# Patient Record
Sex: MALE | Race: WHITE | Employment: FULL TIME | URBAN - METROPOLITAN AREA
[De-identification: names, ages, dates, MRNs, and addresses within clinical notes are randomized per-mention and may not be internally consistent; named-entity substitution may affect disease eponyms.]

---

## 2019-12-22 ENCOUNTER — HOSPITAL ENCOUNTER (EMERGENCY)
Age: 34
Discharge: HOME | End: 2019-12-22
Attending: EMERGENCY MEDICINE
Payer: MEDICAID

## 2019-12-22 ENCOUNTER — APPOINTMENT (OUTPATIENT)
Dept: GENERAL RADIOLOGY | Age: 34
End: 2019-12-22
Payer: MEDICAID

## 2019-12-22 VITALS
WEIGHT: 177.25 LBS | SYSTOLIC BLOOD PRESSURE: 124 MMHG | DIASTOLIC BLOOD PRESSURE: 86 MMHG | HEIGHT: 67 IN | RESPIRATION RATE: 16 BRPM | HEART RATE: 77 BPM | TEMPERATURE: 98.4 F | BODY MASS INDEX: 27.82 KG/M2 | OXYGEN SATURATION: 98 %

## 2019-12-22 DIAGNOSIS — S66.911A WRIST STRAIN, RIGHT, INITIAL ENCOUNTER: ICD-10-CM

## 2019-12-22 DIAGNOSIS — S01.01XA LACERATION OF SCALP, INITIAL ENCOUNTER: ICD-10-CM

## 2019-12-22 DIAGNOSIS — S39.012A STRAIN OF LUMBAR REGION, INITIAL ENCOUNTER: Primary | ICD-10-CM
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
## 2019-12-22 PROCEDURE — 72100 X-RAY EXAM L-S SPINE 2/3 VWS: CPT

## 2019-12-22 PROCEDURE — 73110 X-RAY EXAM OF WRIST: CPT

## 2019-12-22 PROCEDURE — 6370000000 HC RX 637 (ALT 250 FOR IP): Performed by: EMERGENCY MEDICINE

## 2019-12-22 PROCEDURE — 99283 EMERGENCY DEPT VISIT LOW MDM: CPT

## 2019-12-22 RX ORDER — IBUPROFEN 600 MG/1
600 TABLET ORAL EVERY 6 HOURS PRN
Qty: 30 TABLET | Refills: 0 | Status: SHIPPED | OUTPATIENT
Start: 2019-12-22 | End: 2022-08-10

## 2019-12-22 RX ORDER — IBUPROFEN 600 MG/1
600 TABLET ORAL ONCE
Status: COMPLETED | OUTPATIENT
Start: 2019-12-22 | End: 2019-12-22

## 2019-12-22 RX ORDER — HYDROCODONE BITARTRATE AND ACETAMINOPHEN 5; 325 MG/1; MG/1
1 TABLET ORAL EVERY 6 HOURS PRN
Qty: 20 TABLET | Refills: 0 | Status: SHIPPED | OUTPATIENT
Start: 2019-12-22 | End: 2019-12-27

## 2019-12-22 RX ORDER — HYDROCODONE BITARTRATE AND ACETAMINOPHEN 5; 325 MG/1; MG/1
1 TABLET ORAL ONCE
Status: COMPLETED | OUTPATIENT
Start: 2019-12-22 | End: 2019-12-22

## 2019-12-22 RX ADMIN — IBUPROFEN 600 MG: 600 TABLET, FILM COATED ORAL at 13:26

## 2019-12-22 RX ADMIN — HYDROCODONE BITARTRATE AND ACETAMINOPHEN 1 TABLET: 5; 325 TABLET ORAL at 13:27

## 2019-12-22 ASSESSMENT — ENCOUNTER SYMPTOMS
ABDOMINAL PAIN: 0
SHORTNESS OF BREATH: 0
DIARRHEA: 0
RHINORRHEA: 0
EYE REDNESS: 0
SORE THROAT: 0
NAUSEA: 0
EYE DISCHARGE: 0
COUGH: 0
BACK PAIN: 1
WHEEZING: 0
VOMITING: 0
EYE PAIN: 0

## 2019-12-22 ASSESSMENT — PAIN DESCRIPTION - ONSET
ONSET: SUDDEN
ONSET_2: SUDDEN

## 2019-12-22 ASSESSMENT — PAIN DESCRIPTION - LOCATION
LOCATION: WRIST
LOCATION: BACK;WRIST
LOCATION_2: BACK

## 2019-12-22 ASSESSMENT — PAIN SCALES - GENERAL
PAINLEVEL_OUTOF10: 7
PAINLEVEL_OUTOF10: 7
PAINLEVEL_OUTOF10: 8

## 2019-12-22 ASSESSMENT — PAIN DESCRIPTION - ORIENTATION
ORIENTATION_2: LOWER
ORIENTATION: RIGHT

## 2019-12-22 ASSESSMENT — PAIN DESCRIPTION - INTENSITY: RATING_2: 7

## 2019-12-22 ASSESSMENT — PAIN DESCRIPTION - PROGRESSION
CLINICAL_PROGRESSION: GRADUALLY WORSENING
CLINICAL_PROGRESSION_2: GRADUALLY WORSENING

## 2019-12-22 ASSESSMENT — PAIN DESCRIPTION - PAIN TYPE
TYPE: ACUTE PAIN
TYPE: ACUTE PAIN

## 2019-12-22 ASSESSMENT — PAIN - FUNCTIONAL ASSESSMENT
PAIN_FUNCTIONAL_ASSESSMENT: ACTIVITIES ARE NOT PREVENTED
PAIN_FUNCTIONAL_ASSESSMENT: 0-10

## 2019-12-22 ASSESSMENT — PAIN DESCRIPTION - DESCRIPTORS
DESCRIPTORS: THROBBING;SHOOTING
DESCRIPTORS_2: ACHING

## 2019-12-22 ASSESSMENT — PAIN DESCRIPTION - DURATION: DURATION_2: CONTINUOUS

## 2019-12-22 ASSESSMENT — PAIN DESCRIPTION - FREQUENCY: FREQUENCY: CONTINUOUS

## 2020-01-08 ENCOUNTER — HOSPITAL ENCOUNTER (EMERGENCY)
Age: 35
Discharge: LEFT BEFORE DISCHARGE | End: 2020-01-08
Payer: MEDICAID

## 2020-01-08 ENCOUNTER — APPOINTMENT (OUTPATIENT)
Dept: GENERAL RADIOLOGY | Age: 35
End: 2020-01-08
Payer: MEDICAID

## 2020-01-08 VITALS
TEMPERATURE: 98.8 F | RESPIRATION RATE: 16 BRPM | HEIGHT: 67 IN | WEIGHT: 177.25 LBS | DIASTOLIC BLOOD PRESSURE: 74 MMHG | HEART RATE: 102 BPM | BODY MASS INDEX: 27.82 KG/M2 | SYSTOLIC BLOOD PRESSURE: 128 MMHG | OXYGEN SATURATION: 96 %

## 2020-01-08 LAB
ANION GAP SERPL CALCULATED.3IONS-SCNC: 15 MMOL/L (ref 3–16)
BASOPHILS ABSOLUTE: 0.1 K/UL (ref 0–0.2)
BASOPHILS RELATIVE PERCENT: 0.8 %
BUN BLDV-MCNC: 9 MG/DL (ref 7–20)
CALCIUM SERPL-MCNC: 8.8 MG/DL (ref 8.3–10.6)
CHLORIDE BLD-SCNC: 101 MMOL/L (ref 99–110)
CO2: 23 MMOL/L (ref 21–32)
CREAT SERPL-MCNC: 0.8 MG/DL (ref 0.9–1.3)
D DIMER: 214 NG/ML DDU (ref 0–229)
EKG ATRIAL RATE: 102 BPM
EKG DIAGNOSIS: NORMAL
EKG P AXIS: 57 DEGREES
EKG P-R INTERVAL: 124 MS
EKG Q-T INTERVAL: 342 MS
EKG QRS DURATION: 84 MS
EKG QTC CALCULATION (BAZETT): 445 MS
EKG R AXIS: 43 DEGREES
EKG T AXIS: 38 DEGREES
EKG VENTRICULAR RATE: 102 BPM
EOSINOPHILS ABSOLUTE: 0 K/UL (ref 0–0.6)
EOSINOPHILS RELATIVE PERCENT: 0.4 %
GFR AFRICAN AMERICAN: >60
GFR NON-AFRICAN AMERICAN: >60
GLUCOSE BLD-MCNC: 142 MG/DL (ref 70–99)
HCT VFR BLD CALC: 42.8 % (ref 40.5–52.5)
HEMOGLOBIN: 14.3 G/DL (ref 13.5–17.5)
LYMPHOCYTES ABSOLUTE: 0.8 K/UL (ref 1–5.1)
LYMPHOCYTES RELATIVE PERCENT: 9.3 %
MCH RBC QN AUTO: 30.1 PG (ref 26–34)
MCHC RBC AUTO-ENTMCNC: 33.5 G/DL (ref 31–36)
MCV RBC AUTO: 89.7 FL (ref 80–100)
MONOCYTES ABSOLUTE: 0.8 K/UL (ref 0–1.3)
MONOCYTES RELATIVE PERCENT: 8.5 %
NEUTROPHILS ABSOLUTE: 7.3 K/UL (ref 1.7–7.7)
NEUTROPHILS RELATIVE PERCENT: 81 %
PDW BLD-RTO: 13.4 % (ref 12.4–15.4)
PLATELET # BLD: 261 K/UL (ref 135–450)
PMV BLD AUTO: 9.8 FL (ref 5–10.5)
POTASSIUM SERPL-SCNC: 3.7 MMOL/L (ref 3.5–5.1)
RBC # BLD: 4.77 M/UL (ref 4.2–5.9)
SODIUM BLD-SCNC: 139 MMOL/L (ref 136–145)
TROPONIN: <0.01 NG/ML
WBC # BLD: 9 K/UL (ref 4–11)

## 2020-01-08 PROCEDURE — 93005 ELECTROCARDIOGRAM TRACING: CPT | Performed by: PHYSICIAN ASSISTANT

## 2020-01-08 PROCEDURE — 93010 ELECTROCARDIOGRAM REPORT: CPT | Performed by: INTERNAL MEDICINE

## 2020-01-08 PROCEDURE — 85025 COMPLETE CBC W/AUTO DIFF WBC: CPT

## 2020-01-08 PROCEDURE — 99285 EMERGENCY DEPT VISIT HI MDM: CPT

## 2020-01-08 PROCEDURE — 85379 FIBRIN DEGRADATION QUANT: CPT

## 2020-01-08 PROCEDURE — 84484 ASSAY OF TROPONIN QUANT: CPT

## 2020-01-08 PROCEDURE — 71046 X-RAY EXAM CHEST 2 VIEWS: CPT

## 2020-01-08 PROCEDURE — 80048 BASIC METABOLIC PNL TOTAL CA: CPT

## 2020-01-08 RX ORDER — HYDROCODONE BITARTRATE AND ACETAMINOPHEN 5; 325 MG/1; MG/1
1 TABLET ORAL EVERY 6 HOURS PRN
COMMUNITY
End: 2022-08-10

## 2020-01-08 RX ORDER — ONDANSETRON 4 MG/1
4 TABLET, ORALLY DISINTEGRATING ORAL ONCE
Status: DISCONTINUED | OUTPATIENT
Start: 2020-01-08 | End: 2020-01-09 | Stop reason: HOSPADM

## 2020-01-08 ASSESSMENT — ENCOUNTER SYMPTOMS
SHORTNESS OF BREATH: 1
BACK PAIN: 0
ABDOMINAL PAIN: 0
VOMITING: 0
COLOR CHANGE: 0
COUGH: 0

## 2020-01-08 ASSESSMENT — PAIN DESCRIPTION - PROGRESSION: CLINICAL_PROGRESSION: NOT CHANGED

## 2020-01-08 ASSESSMENT — PAIN DESCRIPTION - PAIN TYPE
TYPE: ACUTE PAIN
TYPE: ACUTE PAIN

## 2020-01-08 ASSESSMENT — PAIN DESCRIPTION - FREQUENCY
FREQUENCY: INTERMITTENT
FREQUENCY: CONTINUOUS

## 2020-01-08 ASSESSMENT — PAIN DESCRIPTION - ONSET: ONSET: SUDDEN

## 2020-01-08 ASSESSMENT — PAIN SCALES - GENERAL
PAINLEVEL_OUTOF10: 7
PAINLEVEL_OUTOF10: 8

## 2020-01-08 ASSESSMENT — PAIN DESCRIPTION - LOCATION
LOCATION: CHEST
LOCATION: CHEST

## 2020-01-08 ASSESSMENT — PAIN - FUNCTIONAL ASSESSMENT: PAIN_FUNCTIONAL_ASSESSMENT: ACTIVITIES ARE NOT PREVENTED

## 2020-01-08 ASSESSMENT — PAIN DESCRIPTION - DESCRIPTORS
DESCRIPTORS: PRESSURE;SHARP
DESCRIPTORS: SHARP

## 2020-01-08 NOTE — ED PROVIDER NOTES
round, and reactive to light. Neck:      Musculoskeletal: Normal range of motion. Cardiovascular:      Rate and Rhythm: Tachycardia present. Pulses: Normal pulses. Pulmonary:      Effort: Pulmonary effort is normal. No respiratory distress. Abdominal:      Tenderness: There is no tenderness. Musculoskeletal: Normal range of motion. General: No swelling. Skin:     General: Skin is warm. Neurological:      General: No focal deficit present. Mental Status: He is alert and oriented to person, place, and time. DIAGNOSTIC RESULTS     EKG: All EKG's are interpreted by MARLINE Castillo in the absence of a cardiologist.    EKG interpreted by myself - please refer to attending physician's note for complete EKG interpretation:    Rhythm: sinus rhythm   Rate: tachy 102  No evidence of acute ischemia or injury. RADIOLOGY:   Non-plain film images such as CT, Ultrasound and MRI are read by the radiologist. Plain radiographic images are visualized and preliminarily interpreted by MARLINE Castillo with the below findings:    Reviewed radiologist's interpretation.     Interpretation per the Radiologist below, if available at the time of this note:    XR CHEST STANDARD (2 VW)   Final Result   No acute findings               LABS:  Labs Reviewed   CBC WITH AUTO DIFFERENTIAL - Abnormal; Notable for the following components:       Result Value    Lymphocytes Absolute 0.8 (*)     All other components within normal limits    Narrative:     Performed at:  [address] [address]   Phone [phone]   BASIC METABOLIC PANEL - Abnormal; Notable for the following components:    Glucose 142 (*)     CREATININE 0.8 (*)     All other components within normal limits    Narrative:     Performed at:  [address] [address]   Phone [phone]   D-DIMER, QUANTITATIVE Narrative:     Performed at:  [facility]) [facility]  [address]   Phone [phone]   TROPONIN       All other labs were within normal range or not returned as of this dictation. EMERGENCY DEPARTMENT COURSE and DIFFERENTIAL DIAGNOSIS/MDM:   Vitals:    Vitals:    01/08/20 1341   BP: 128/74   Pulse: 102   Resp: 16   Temp: 98.8 °F (37.1 °C)   TempSrc: Oral   SpO2: 96%   Weight: 177 lb 4 oz (80.4 kg)   Height: 5' 7\" (1.702 m)     Patient is afebrile, initially tachycardic at 102 blood pressure 598 systolic. D-dimer not elevated. The patient eloped from the emergency department before all of the lab work had resulted and I was able to discharge him or reevaluate him. CONSULTS:  None    PROCEDURES:  None    FINAL IMPRESSION      1. Chest pain, unspecified type          DISPOSITION/PLAN   DISPOSITION        PATIENT REFERRED TO:  No follow-up provider specified.     DISCHARGE MEDICATIONS:  New Prescriptions    No medications on file       (Please note that portions of this note were completed with a voice recognition program.  Efforts were made to edit the dictations but occasionally words are mis-transcribed.)    Adelita Castro, [address], [place]  01/08/20 2090

## 2020-01-08 NOTE — ED NOTES
This nurse went into the room to discharge patient and the patient was not in the room.       Omer Meier RN  01/08/20 4161

## 2020-01-08 NOTE — ED NOTES
OFFICER AGARWAL -PHONE [phone]. PLEASE CALL WHEN DISCHARGED.       Airam Parish RN  01/08/20 6374
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
## 2020-04-27 ENCOUNTER — HOSPITAL ENCOUNTER (EMERGENCY)
Age: 35
Discharge: LEFT BEFORE DISCHARGE | End: 2020-04-27
Attending: EMERGENCY MEDICINE
Payer: MEDICAID

## 2022-08-10 ENCOUNTER — HOSPITAL ENCOUNTER (EMERGENCY)
Age: 37
Discharge: HOME | End: 2022-08-10
Payer: MEDICAID

## 2022-08-10 ENCOUNTER — APPOINTMENT (OUTPATIENT)
Dept: GENERAL RADIOLOGY | Age: 37
End: 2022-08-10
Payer: MEDICAID

## 2022-08-10 VITALS
DIASTOLIC BLOOD PRESSURE: 88 MMHG | BODY MASS INDEX: 28.88 KG/M2 | OXYGEN SATURATION: 99 % | HEIGHT: 67 IN | RESPIRATION RATE: 16 BRPM | WEIGHT: 184 LBS | TEMPERATURE: 97.6 F | HEART RATE: 79 BPM | SYSTOLIC BLOOD PRESSURE: 137 MMHG

## 2022-08-10 DIAGNOSIS — S90.01XA CONTUSION OF RIGHT ANKLE, INITIAL ENCOUNTER: Primary | ICD-10-CM

## 2022-08-10 PROCEDURE — 99283 EMERGENCY DEPT VISIT LOW MDM: CPT

## 2022-08-10 PROCEDURE — 73610 X-RAY EXAM OF ANKLE: CPT

## 2022-08-10 ASSESSMENT — ENCOUNTER SYMPTOMS
BACK PAIN: 0
COLOR CHANGE: 1

## 2022-08-10 ASSESSMENT — PAIN - FUNCTIONAL ASSESSMENT
PAIN_FUNCTIONAL_ASSESSMENT: NONE - DENIES PAIN
PAIN_FUNCTIONAL_ASSESSMENT: NONE - DENIES PAIN
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
## 2022-08-10 NOTE — Clinical Note
Vianney Perez was seen and treated in our emergency department on 8/10/2022. He may return to work on 08/13/2022. If you have any questions or concerns, please don't hesitate to call.       Jorge Alberto Marquez PA-C

## 2022-08-11 ENCOUNTER — TELEPHONE (OUTPATIENT)
Dept: ORTHOPEDIC SURGERY | Age: 37
End: 2022-08-11

## 2022-08-12 ENCOUNTER — TELEPHONE (OUTPATIENT)
Dept: ORTHOPEDIC SURGERY | Age: 37
End: 2022-08-12

## 2022-08-15 ENCOUNTER — TELEPHONE (OUTPATIENT)
Dept: ORTHOPEDIC SURGERY | Age: 37
End: 2022-08-15

## 2022-10-16 ENCOUNTER — APPOINTMENT (OUTPATIENT)
Dept: CT IMAGING | Age: 37
End: 2022-10-16
Payer: MEDICAID

## 2022-10-16 ENCOUNTER — HOSPITAL ENCOUNTER (EMERGENCY)
Age: 37
Discharge: HOME | End: 2022-10-16
Attending: EMERGENCY MEDICINE
Payer: MEDICAID

## 2022-10-16 VITALS
RESPIRATION RATE: 16 BRPM | BODY MASS INDEX: 32.03 KG/M2 | HEART RATE: 71 BPM | OXYGEN SATURATION: 98 % | TEMPERATURE: 97.9 F | WEIGHT: 199.3 LBS | DIASTOLIC BLOOD PRESSURE: 75 MMHG | SYSTOLIC BLOOD PRESSURE: 118 MMHG | HEIGHT: 66 IN

## 2022-10-16 DIAGNOSIS — R10.11 RIGHT UPPER QUADRANT ABDOMINAL PAIN: Primary | ICD-10-CM

## 2022-10-16 DIAGNOSIS — R11.2 NAUSEA AND VOMITING, UNSPECIFIED VOMITING TYPE: ICD-10-CM

## 2022-10-16 LAB
A/G RATIO: 2 (ref 1.1–2.2)
ALBUMIN SERPL-MCNC: 4.2 G/DL (ref 3.4–5)
ALP BLD-CCNC: 62 U/L (ref 40–129)
ALT SERPL-CCNC: 90 U/L (ref 10–40)
ANION GAP SERPL CALCULATED.3IONS-SCNC: 10 MMOL/L (ref 3–16)
AST SERPL-CCNC: 42 U/L (ref 15–37)
ATYPICAL LYMPHOCYTE RELATIVE PERCENT: 2 % (ref 0–6)
BANDED NEUTROPHILS RELATIVE PERCENT: 2 % (ref 0–7)
BASOPHILS ABSOLUTE: 0.1 K/UL (ref 0–0.2)
BASOPHILS RELATIVE PERCENT: 1 %
BILIRUB SERPL-MCNC: 0.3 MG/DL (ref 0–1)
BILIRUBIN URINE: NEGATIVE
BLOOD, URINE: NEGATIVE
BUN BLDV-MCNC: 8 MG/DL (ref 7–20)
CALCIUM SERPL-MCNC: 9.5 MG/DL (ref 8.3–10.6)
CHLORIDE BLD-SCNC: 105 MMOL/L (ref 99–110)
CLARITY: ABNORMAL
CO2: 30 MMOL/L (ref 21–32)
COLOR: YELLOW
CREAT SERPL-MCNC: 1.1 MG/DL (ref 0.9–1.3)
EOSINOPHILS ABSOLUTE: 0.2 K/UL (ref 0–0.6)
EOSINOPHILS RELATIVE PERCENT: 2 %
GFR AFRICAN AMERICAN: >60
GFR NON-AFRICAN AMERICAN: >60
GLUCOSE BLD-MCNC: 97 MG/DL (ref 70–99)
GLUCOSE URINE: NEGATIVE MG/DL
HCT VFR BLD CALC: 40.4 % (ref 40.5–52.5)
HEMOGLOBIN: 14 G/DL (ref 13.5–17.5)
KETONES, URINE: NEGATIVE MG/DL
LEUKOCYTE ESTERASE, URINE: NEGATIVE
LIPASE: 14 U/L (ref 13–60)
LYMPHOCYTES ABSOLUTE: 2.6 K/UL (ref 1–5.1)
LYMPHOCYTES RELATIVE PERCENT: 27 %
MCH RBC QN AUTO: 29.6 PG (ref 26–34)
MCHC RBC AUTO-ENTMCNC: 34.8 G/DL (ref 31–36)
MCV RBC AUTO: 85.1 FL (ref 80–100)
MICROSCOPIC EXAMINATION: ABNORMAL
MONOCYTES ABSOLUTE: 1 K/UL (ref 0–1.3)
MONOCYTES RELATIVE PERCENT: 11 %
NEUTROPHILS ABSOLUTE: 5.1 K/UL (ref 1.7–7.7)
NEUTROPHILS RELATIVE PERCENT: 55 %
NITRITE, URINE: NEGATIVE
OCCULT BLOOD SCREENING: NORMAL
PDW BLD-RTO: 15 % (ref 12.4–15.4)
PH UA: 8 (ref 5–8)
PLATELET # BLD: 226 K/UL (ref 135–450)
PMV BLD AUTO: 8.9 FL (ref 5–10.5)
POTASSIUM REFLEX MAGNESIUM: 4.1 MMOL/L (ref 3.5–5.1)
PROTEIN UA: NEGATIVE MG/DL
RBC # BLD: 4.74 M/UL (ref 4.2–5.9)
SODIUM BLD-SCNC: 145 MMOL/L (ref 136–145)
SPECIFIC GRAVITY UA: 1.01 (ref 1–1.03)
TOTAL PROTEIN: 6.3 G/DL (ref 6.4–8.2)
URINE REFLEX TO CULTURE: ABNORMAL
URINE TYPE: ABNORMAL
UROBILINOGEN, URINE: 0.2 E.U./DL
WBC # BLD: 8.9 K/UL (ref 4–11)

## 2022-10-16 PROCEDURE — 83690 ASSAY OF LIPASE: CPT

## 2022-10-16 PROCEDURE — 6360000004 HC RX CONTRAST MEDICATION: Performed by: EMERGENCY MEDICINE

## 2022-10-16 PROCEDURE — 96375 TX/PRO/DX INJ NEW DRUG ADDON: CPT

## 2022-10-16 PROCEDURE — 74177 CT ABD & PELVIS W/CONTRAST: CPT

## 2022-10-16 PROCEDURE — 6360000002 HC RX W HCPCS: Performed by: EMERGENCY MEDICINE

## 2022-10-16 PROCEDURE — 80074 ACUTE HEPATITIS PANEL: CPT

## 2022-10-16 PROCEDURE — 96374 THER/PROPH/DIAG INJ IV PUSH: CPT

## 2022-10-16 PROCEDURE — 2580000003 HC RX 258: Performed by: EMERGENCY MEDICINE

## 2022-10-16 PROCEDURE — 82270 OCCULT BLOOD FECES: CPT

## 2022-10-16 PROCEDURE — 6370000000 HC RX 637 (ALT 250 FOR IP): Performed by: EMERGENCY MEDICINE

## 2022-10-16 PROCEDURE — 36415 COLL VENOUS BLD VENIPUNCTURE: CPT

## 2022-10-16 PROCEDURE — 80053 COMPREHEN METABOLIC PANEL: CPT

## 2022-10-16 PROCEDURE — 85025 COMPLETE CBC W/AUTO DIFF WBC: CPT

## 2022-10-16 PROCEDURE — 99285 EMERGENCY DEPT VISIT HI MDM: CPT

## 2022-10-16 PROCEDURE — 2500000003 HC RX 250 WO HCPCS: Performed by: EMERGENCY MEDICINE

## 2022-10-16 PROCEDURE — 96361 HYDRATE IV INFUSION ADD-ON: CPT

## 2022-10-16 PROCEDURE — 81003 URINALYSIS AUTO W/O SCOPE: CPT

## 2022-10-16 RX ORDER — DICYCLOMINE HYDROCHLORIDE 10 MG/1
10 CAPSULE ORAL ONCE
Status: COMPLETED | OUTPATIENT
Start: 2022-10-16 | End: 2022-10-16

## 2022-10-16 RX ORDER — FAMOTIDINE 20 MG/1
20 TABLET, FILM COATED ORAL 2 TIMES DAILY
Qty: 60 TABLET | Refills: 0 | Status: ON HOLD | OUTPATIENT
Start: 2022-10-16 | End: 2022-10-27 | Stop reason: HOSPADM

## 2022-10-16 RX ORDER — KETOROLAC TROMETHAMINE 15 MG/ML
15 INJECTION, SOLUTION INTRAMUSCULAR; INTRAVENOUS ONCE
Status: COMPLETED | OUTPATIENT
Start: 2022-10-16 | End: 2022-10-16

## 2022-10-16 RX ORDER — DICYCLOMINE HYDROCHLORIDE 10 MG/1
10 CAPSULE ORAL EVERY 6 HOURS PRN
Qty: 20 CAPSULE | Refills: 0 | Status: ON HOLD | OUTPATIENT
Start: 2022-10-16 | End: 2022-10-27 | Stop reason: HOSPADM

## 2022-10-16 RX ORDER — ONDANSETRON 4 MG/1
4 TABLET, ORALLY DISINTEGRATING ORAL ONCE
Status: COMPLETED | OUTPATIENT
Start: 2022-10-16 | End: 2022-10-16

## 2022-10-16 RX ORDER — 0.9 % SODIUM CHLORIDE 0.9 %
1000 INTRAVENOUS SOLUTION INTRAVENOUS ONCE
Status: COMPLETED | OUTPATIENT
Start: 2022-10-16 | End: 2022-10-16

## 2022-10-16 RX ORDER — FAMOTIDINE 10 MG/ML
20 INJECTION, SOLUTION INTRAVENOUS ONCE
Status: COMPLETED | OUTPATIENT
Start: 2022-10-16 | End: 2022-10-16

## 2022-10-16 RX ORDER — ONDANSETRON 4 MG/1
4 TABLET, ORALLY DISINTEGRATING ORAL EVERY 8 HOURS PRN
Qty: 20 TABLET | Refills: 0 | Status: SHIPPED | OUTPATIENT
Start: 2022-10-16

## 2022-10-16 RX ADMIN — KETOROLAC TROMETHAMINE 15 MG: 15 INJECTION, SOLUTION INTRAMUSCULAR; INTRAVENOUS at 19:57

## 2022-10-16 RX ADMIN — DICYCLOMINE HYDROCHLORIDE 10 MG: 10 CAPSULE ORAL at 22:38

## 2022-10-16 RX ADMIN — ONDANSETRON 4 MG: 4 TABLET, ORALLY DISINTEGRATING ORAL at 19:48

## 2022-10-16 RX ADMIN — SODIUM CHLORIDE 1000 ML: 9 INJECTION, SOLUTION INTRAVENOUS at 19:56

## 2022-10-16 RX ADMIN — FAMOTIDINE 20 MG: 10 INJECTION, SOLUTION INTRAVENOUS at 22:35

## 2022-10-16 RX ADMIN — IOPAMIDOL 100 ML: 755 INJECTION, SOLUTION INTRAVENOUS at 20:56

## 2022-10-16 ASSESSMENT — PAIN DESCRIPTION - DESCRIPTORS: DESCRIPTORS: SHARP

## 2022-10-16 ASSESSMENT — PAIN DESCRIPTION - ORIENTATION: ORIENTATION: RIGHT

## 2022-10-16 ASSESSMENT — ENCOUNTER SYMPTOMS
EYE PAIN: 0
BACK PAIN: 1
COUGH: 1
RHINORRHEA: 0
ABDOMINAL PAIN: 1
SORE THROAT: 1

## 2022-10-16 ASSESSMENT — PAIN DESCRIPTION - LOCATION: LOCATION: ABDOMEN

## 2022-10-16 ASSESSMENT — PAIN SCALES - GENERAL
PAINLEVEL_OUTOF10: 7
PAINLEVEL_OUTOF10: 8
PAINLEVEL_OUTOF10: 8

## 2022-10-16 ASSESSMENT — PAIN DESCRIPTION - PAIN TYPE: TYPE: ACUTE PAIN

## 2022-10-16 NOTE — ED NOTES
[id number]   Pt very talkative and difficult to stay focused on question asked. Sick since 10/10 with constant right sided abd pain and intermittent dizziness. abd pain now at 8 and took aleve at 1300. Vomited 4 times today. Greenish/black diarrhea today once (reports black tarry stool yesterday). Pt states fell down last 2 indoor wooden steps on 10/13 due to feeling dizzy (also reports having fever 101 that day)-pt fell backwards onto back and then forward onto knees. Healing dry abrasions to bilat lower legs (just below knees).        Ayan Gomez, JADA  10/16/22 1094

## 2022-10-16 NOTE — ED PROVIDER NOTES
[address] [facility] ENCOUNTER      Pt Name: Rayo Berg  MRN: [id number]  [place] 1985  Date of evaluation: 10/16/2022  Provider: Coy Oliva MD    CHIEF COMPLAINT       Chief Complaint   Patient presents with    Abdominal Pain     X3days, with n/v and dark stools         HISTORY OF PRESENT ILLNESS   (Location/Symptom, Timing/Onset,Context/Setting, Quality, Duration, Modifying Factors, Severity)  Note limiting factors. Rayo Berg is a 44 y.o. male who presents to the emergency department for abdominal pain since Monday. Complete right sided pain radiating to the back, he was sitting watching tv, started off as a strong pain initially intermittent then became constant. He has been vomiting since Thursday. Every time he eats he feels a sourness. He is able to keep soup down. Pain does not change with eating. He has had diarrhea and has had tarry dark stools. Started having fevers on Thursday. Has tried Aleve, ibuprofen, imitrol and tylenol. These did not help him at all. He has been lightheaded and he fell on Thursday. Nursing notes were reviewed. REVIEW OF SYSTEMS    (2-9 systems for level 4, 10 or more for level 5)     Review of Systems   Constitutional:  Positive for chills and fever. Negative for diaphoresis. HENT:  Positive for sore throat. Negative for rhinorrhea. Eyes:  Negative for pain. Respiratory:  Positive for cough. Cardiovascular:  Negative for chest pain. Gastrointestinal:  Positive for abdominal pain. Genitourinary:  Negative for dysuria. Musculoskeletal:  Positive for back pain. Neurological:  Negative for headaches. PAST MEDICAL HISTORY     History reviewed. No pertinent past medical history.         SURGICALHISTORY       Past Surgical History:   Procedure Laterality Date    ANKLE SURGERY           CURRENT MEDICATIONS       Discharge Medication List as of 10/16/2022 10:40 PM ALLERGIES     Depakote [divalproex sodium] and Pcn [penicillins]    FAMILY HISTORY       Family History   Problem Relation Age of Onset    Cancer Other     Diabetes Other     High Blood Pressure Other     Stroke Other           SOCIAL HISTORY       Social History     Socioeconomic History    Marital status: Single     Spouse name: None    Number of children: 1    Years of education: None    Highest education level: None   Occupational History    Occupation: Unemployed   Tobacco Use    Smoking status: Former    Smokeless tobacco: Never    Tobacco comments:     10/16/22 vapes   Vaping Use    Vaping Use: Every day    Substances: Nicotine, Flavoring    Devices: Pre-filled or refillable cartridge   Substance and Sexual Activity    Alcohol use: Yes     Comment: 10/16/22 beer once every month    Drug use: No       SCREENINGS    Alan Coma Scale  Eye Opening: Spontaneous  Best Verbal Response: Oriented  Best Motor Response: Obeys commands  Alan Coma Scale Score: 15        PHYSICAL EXAM    (up to 7 for level 4, 8 or more for level 5)     ED Triage Vitals [10/16/22 1856]   BP Temp Temp Source Heart Rate Resp SpO2 Height Weight   (!) 133/92 97.9 °F (36.6 °C) Oral 60 16 98 % 5' 6\" (1.676 m) 199 lb 4.7 oz (90.4 kg)       Physical Exam  Vitals and nursing note reviewed. Constitutional:       Appearance: Normal appearance. He is well-developed. He is not ill-appearing. HENT:      Head: Normocephalic and atraumatic. Right Ear: External ear normal.      Left Ear: External ear normal.      Nose: Nose normal.   Eyes:      General: No scleral icterus. Right eye: No discharge. Left eye: No discharge. Conjunctiva/sclera: Conjunctivae normal.   Cardiovascular:      Rate and Rhythm: Normal rate and regular rhythm. Heart sounds: Normal heart sounds. Pulmonary:      Effort: Pulmonary effort is normal. No respiratory distress. Breath sounds: Normal breath sounds. No wheezing or rales. Abdominal:      General: Bowel sounds are normal. There is no distension. Palpations: Abdomen is soft. Tenderness: There is abdominal tenderness in the right upper quadrant. There is right CVA tenderness. There is no guarding or rebound. Positive signs include Aly's sign. Hernia: No hernia is present. Musculoskeletal:      Cervical back: Neck supple. Skin:     Coloration: Skin is not pale. Neurological:      Mental Status: He is alert. Psychiatric:         Mood and Affect: Mood normal.         Behavior: Behavior normal.           DIAGNOSTIC RESULTS     EKG: All EKG's are interpreted by the Emergency Department Physician who either signs or Co-signs this chart in the absence of a cardiologist.    12 lead EKG shows     RADIOLOGY:   Non-plain film images such as CT, Ultrasound and MRI are read by the radiologist. Plain radiographic images are visualized and preliminarily interpreted by the emergency physician with the below findings:        Interpretation per the Radiologist below, if available at the time of this note:    CT ABDOMEN PELVIS W IV CONTRAST Additional Contrast? None   Final Result   1. Periportal adenopathy, likely reactive. US GALLBLADDER RUQ    (Results Pending)         ED BEDSIDE ULTRASOUND:   Performed by ED Physician - none    LABS:  Labs Reviewed   CBC WITH AUTO DIFFERENTIAL - Abnormal; Notable for the following components:       Result Value    Hematocrit 40.4 (*)     All other components within normal limits   COMPREHENSIVE METABOLIC PANEL W/ REFLEX TO MG FOR LOW K - Abnormal; Notable for the following components:     Total Protein 6.3 (*)     ALT 90 (*)     AST 42 (*)     All other components within normal limits   URINALYSIS WITH REFLEX TO CULTURE - Abnormal; Notable for the following components:    Clarity, UA SL CLOUDY (*)     All other components within normal limits   LIPASE   BLOOD OCCULT STOOL SCREEN #1    Narrative:     ORDER#: [id number] ORDERED BY: VALENCIA ARCINIEGA  SOURCE: Stool                              COLLECTED:  10/16/22 20:27  ANTIBIOTICS AT MIA.:                      RECEIVED :  10/16/22 20:36   HEPATITIS PANEL, ACUTE       All other labs were within normal range or not returned as of this dictation. EMERGENCY DEPARTMENT COURSE and DIFFERENTIAL DIAGNOSIS/MDM:   Vitals:    Vitals:    10/16/22 1856 10/16/22 2115   BP: (!) 133/92 118/75   Pulse: 60 71   Resp: 16 16   Temp: 97.9 °F (36.6 °C)    TempSrc: Oral    SpO2: 98% 98%   Weight: 199 lb 4.7 oz (90.4 kg)    Height: 5' 6\" (1.676 m)        Adult male who has been having right-sided abdominal pain associated with dark stools no vomiting and intermittent diarrhea. Patient also states he has been having intermittent fevers. Basic laboratory studies and a CT scan ordered. The patient is initially given Toradol for his pain. Laboratory studies are unremarkable for any acute process except that his liver enzymes are elevated. Hepatitis panel was ordered. His CT scan shows periportal adenopathy with no other findings. The gallbladder was constricted. Patient's pain is trending down. He is given additional medications here in the emergency room. His Hemoccult is negative. Based on history physical exam and diagnostic work-up at this point I believe that the patient is low risk for serious or life-threatening condition and require further work-up or admission at this point. I recommend continued outpatient care. Return instructions discussed with the patient expresses understanding and is agreeable. Is this patient to be included in the SEP-1 Core Measure due to severe sepsis or septic shock? No   Exclusion criteria - the patient is NOT to be included for SEP-1 Core Measure due to: Infection is not suspected     CRITICAL CARE TIME   None       CONSULTS:  None    PROCEDURES:       Procedures    FINAL IMPRESSION      1. Right upper quadrant abdominal pain    2. Nausea and vomiting, unspecified vomiting type          DISPOSITION/PLAN   DISPOSITION Decision To Discharge 10/16/2022 09:52:12 PM      PATIENT REFERREDTO:  [facility]) Pre-Services  [phone]        DISCHARGEMEDICATIONS:  Discharge Medication List as of 10/16/2022 10:40 PM        START taking these medications    Details   dicyclomine (BENTYL) 10 MG capsule Take 1 capsule by mouth every 6 hours as needed (cramps), Disp-20 capsule, R-0Print      famotidine (PEPCID) 20 MG tablet Take 1 tablet by mouth 2 times daily, Disp-60 tablet, R-0Print      ondansetron (ZOFRAN ODT) 4 MG disintegrating tablet Take 1 tablet by mouth every 8 hours as needed for Nausea, Disp-20 tablet, R-0Print                (Please note that portions of this note were completed with a voice recognition program.  Efforts were made to edit the dictations but occasionally words are mis-transcribed.)    Jodi Bradley MD (electronically signed)  Attending Emergency Physician  I am the primary physician of record         Jodi Bradley MD  10/17/22 1852

## 2022-10-16 NOTE — LETTER
October 20, 2022    Naya Orantes  [address]      Dear Mr. Naya Orantes,    During your Emergency Department visit on 10/16/2022, radiology and/or lab tests were taken and sent for analysis. The Emergency Department physician has reviewed the results and would like to discuss the findings with you. As of this time, attempts to reach you have been unsuccessful. Please contact the Emergency Department at [phone] and ask to speak to the Charge Nurse regarding your results and the possible need for further treatment.     Sincerely,     Dr. Hayley GUSMAN [address]

## 2022-10-16 NOTE — Clinical Note
Toni Rich was seen and treated in our emergency department on 10/16/2022. He may return to work on 10/19/2022. If you have any questions or concerns, please don't hesitate to call.       Alcides Mckee MD

## 2022-10-17 LAB
HAV IGM SER IA-ACNC: ABNORMAL
HEPATITIS B CORE IGM ANTIBODY: ABNORMAL
HEPATITIS B SURFACE ANTIGEN INTERPRETATION: ABNORMAL
HEPATITIS C ANTIBODY INTERPRETATION: REACTIVE

## 2022-10-17 NOTE — ED NOTES
Explained new orders. Right abd pain at 8. No vomiting here in ED.   Instructed in need for urine spec     Kriss Voss RN  10/16/22 2005

## 2022-10-17 NOTE — ED NOTES
Voided clear yellow urine. Reports right sided abd pain at 7 now. No vomiting. Asking when he will be able to leave.      Jacob Iyer RN  10/16/22 2124

## 2022-10-17 NOTE — ED NOTES
EMD to bedside with this RN as chaperone to do rectal exam.  EMD discussed skin tags present around rectum-encouraged follow up with surgeon to discuss options for removal.  No signs of bright red blood with rectal exam.    EMD and this RN encouraged to give urine specimen, pt states still can't void. Explained he has had half of an IV bag and should be able to urinate now. Right sided abd pain at 6-EMD discussed that she will wait for CT results to see how to treat his pain.      Kayla Campbell RN  10/16/22 2036

## 2022-10-18 NOTE — ED NOTES
Right sided abd pain at 6-7. No vomiting in ED. Discharge instructions with pt. Explained rx's. Explained outpt ultrasound of gallbladder to be done. Aware to be NPO after midnight for test.   Explained how to get outpt test scheduled. Encouraged follow up with PCP. Explained importance of having a family doctor for regular medical care. Explained how to get a family doctor. [facility] given. [facility] clinic list given. [facility] referral line given.          Jacob Iyer RN  10/17/22 2966

## 2022-10-18 NOTE — ED NOTES
Explained new orders. Right sided abd pain at 7. EMD would like hepatitis panel drawn before leaving though.      Noan Chapa RN  10/17/22 2381

## 2022-10-20 NOTE — RESULT ENCOUNTER NOTE
October 20, 2022    Spenser Nguyen  [address]  [place]      Dear Mr. Spenser Nguyen,    During your Emergency Department visit on 10/16/2022, radiology and/or lab tests were taken and sent for analysis. The Emergency Department physician has reviewed the results and would like to discuss the findings with you. As of this time, attempts to reach you have been unsuccessful. Please contact the Emergency Department at [phone] and ask to speak to the Charge Nurse regarding your results and the possible need for further treatment. Sincerely,     Dr. Valencia [facility]  [address]        Sent certified letter to address on file.

## 2022-10-24 ENCOUNTER — HOSPITAL ENCOUNTER (INPATIENT)
Age: 37
LOS: 3 days | Discharge: HOME | DRG: 391 | End: 2022-10-27
Attending: STUDENT IN AN ORGANIZED HEALTH CARE EDUCATION/TRAINING PROGRAM | Admitting: STUDENT IN AN ORGANIZED HEALTH CARE EDUCATION/TRAINING PROGRAM
Payer: MEDICAID

## 2022-10-24 ENCOUNTER — APPOINTMENT (OUTPATIENT)
Dept: CT IMAGING | Age: 37
DRG: 391 | End: 2022-10-24
Payer: MEDICAID

## 2022-10-24 ENCOUNTER — APPOINTMENT (OUTPATIENT)
Dept: GENERAL RADIOLOGY | Age: 37
DRG: 391 | End: 2022-10-24
Payer: MEDICAID

## 2022-10-24 DIAGNOSIS — T50.901A ACCIDENTAL DRUG OVERDOSE, INITIAL ENCOUNTER: ICD-10-CM

## 2022-10-24 DIAGNOSIS — R41.89 UNRESPONSIVE EPISODE: ICD-10-CM

## 2022-10-24 DIAGNOSIS — N17.9 AKI (ACUTE KIDNEY INJURY) (HCC): Primary | ICD-10-CM

## 2022-10-24 DIAGNOSIS — B19.20 HEPATITIS C VIRUS INFECTION WITHOUT HEPATIC COMA, UNSPECIFIED CHRONICITY: ICD-10-CM

## 2022-10-24 DIAGNOSIS — E87.8 HYPOCHLOREMIA: ICD-10-CM

## 2022-10-24 DIAGNOSIS — F19.10 POLYSUBSTANCE ABUSE (HCC): ICD-10-CM

## 2022-10-24 DIAGNOSIS — R10.11 ABDOMINAL PAIN, RIGHT UPPER QUADRANT: ICD-10-CM

## 2022-10-24 DIAGNOSIS — K92.0 COFFEE GROUND EMESIS: ICD-10-CM

## 2022-10-24 PROBLEM — R09.02 HYPOXIA: Status: ACTIVE | Noted: 2022-10-24

## 2022-10-24 PROBLEM — R10.9 ABDOMINAL PAIN: Status: ACTIVE | Noted: 2022-10-24

## 2022-10-24 PROBLEM — R41.82 AMS (ALTERED MENTAL STATUS): Status: ACTIVE | Noted: 2022-10-24

## 2022-10-24 LAB
A/G RATIO: 1.5 (ref 1.1–2.2)
ALBUMIN SERPL-MCNC: 5.1 G/DL (ref 3.4–5)
ALP BLD-CCNC: 59 U/L (ref 40–129)
ALT SERPL-CCNC: 86 U/L (ref 10–40)
AMPHETAMINE SCREEN, URINE: ABNORMAL
ANION GAP SERPL CALCULATED.3IONS-SCNC: 16 MMOL/L (ref 3–16)
AST SERPL-CCNC: 64 U/L (ref 15–37)
BACTERIA: NORMAL /HPF
BARBITURATE SCREEN URINE: ABNORMAL
BASOPHILS ABSOLUTE: 0 K/UL (ref 0–0.2)
BASOPHILS RELATIVE PERCENT: 0.3 %
BENZODIAZEPINE SCREEN, URINE: ABNORMAL
BILIRUB SERPL-MCNC: 0.8 MG/DL (ref 0–1)
BILIRUBIN URINE: NEGATIVE
BLOOD, URINE: NEGATIVE
BUN BLDV-MCNC: 22 MG/DL (ref 7–20)
CALCIUM SERPL-MCNC: 9.1 MG/DL (ref 8.3–10.6)
CANNABINOID SCREEN URINE: POSITIVE
CHLORIDE BLD-SCNC: 88 MMOL/L (ref 99–110)
CLARITY: CLEAR
CO2: 33 MMOL/L (ref 21–32)
COCAINE METABOLITE SCREEN URINE: POSITIVE
COLOR: YELLOW
CREAT SERPL-MCNC: 2.1 MG/DL (ref 0.9–1.3)
EOSINOPHILS ABSOLUTE: 0 K/UL (ref 0–0.6)
EOSINOPHILS RELATIVE PERCENT: 0 %
EPITHELIAL CELLS, UA: 1 /HPF (ref 0–5)
ETHANOL: NORMAL MG/DL (ref 0–0.08)
FENTANYL SCREEN, URINE: POSITIVE
GFR SERPL CREATININE-BSD FRML MDRD: 40 ML/MIN/{1.73_M2}
GLUCOSE BLD-MCNC: 178 MG/DL (ref 70–99)
GLUCOSE URINE: NEGATIVE MG/DL
HCT VFR BLD CALC: 45 % (ref 40.5–52.5)
HEMOGLOBIN: 14.8 G/DL (ref 13.5–17.5)
HYALINE CASTS: 3 /LPF (ref 0–8)
KETONES, URINE: ABNORMAL MG/DL
LACTIC ACID, SEPSIS: 2 MMOL/L (ref 0.4–1.9)
LEUKOCYTE ESTERASE, URINE: NEGATIVE
LIPASE: 14 U/L (ref 13–60)
LYMPHOCYTES ABSOLUTE: 1.1 K/UL (ref 1–5.1)
LYMPHOCYTES RELATIVE PERCENT: 9.7 %
Lab: ABNORMAL
MCH RBC QN AUTO: 28.7 PG (ref 26–34)
MCHC RBC AUTO-ENTMCNC: 32.9 G/DL (ref 31–36)
MCV RBC AUTO: 87.4 FL (ref 80–100)
METHADONE SCREEN, URINE: ABNORMAL
MICROSCOPIC EXAMINATION: YES
MONOCYTES ABSOLUTE: 0.6 K/UL (ref 0–1.3)
MONOCYTES RELATIVE PERCENT: 4.9 %
NEUTROPHILS ABSOLUTE: 9.9 K/UL (ref 1.7–7.7)
NEUTROPHILS RELATIVE PERCENT: 85.1 %
NITRITE, URINE: NEGATIVE
OPIATE SCREEN URINE: POSITIVE
OXYCODONE URINE: ABNORMAL
PDW BLD-RTO: 14.8 % (ref 12.4–15.4)
PH UA: 7.5
PH UA: 7.5 (ref 5–8)
PHENCYCLIDINE SCREEN URINE: ABNORMAL
PLATELET # BLD: 284 K/UL (ref 135–450)
PMV BLD AUTO: 9.4 FL (ref 5–10.5)
POTASSIUM SERPL-SCNC: 3.9 MMOL/L (ref 3.5–5.1)
PROTEIN UA: ABNORMAL MG/DL
RBC # BLD: 5.15 M/UL (ref 4.2–5.9)
RBC UA: 0 /HPF (ref 0–4)
SODIUM BLD-SCNC: 137 MMOL/L (ref 136–145)
SPECIFIC GRAVITY UA: 1.05 (ref 1–1.03)
TOTAL PROTEIN: 8.5 G/DL (ref 6.4–8.2)
URINE REFLEX TO CULTURE: ABNORMAL
URINE TYPE: ABNORMAL
UROBILINOGEN, URINE: 1 E.U./DL
WBC # BLD: 11.7 K/UL (ref 4–11)
WBC UA: 1 /HPF (ref 0–5)

## 2022-10-24 PROCEDURE — 83605 ASSAY OF LACTIC ACID: CPT

## 2022-10-24 PROCEDURE — 85025 COMPLETE CBC W/AUTO DIFF WBC: CPT

## 2022-10-24 PROCEDURE — 1200000000 HC SEMI PRIVATE

## 2022-10-24 PROCEDURE — 2580000003 HC RX 258: Performed by: STUDENT IN AN ORGANIZED HEALTH CARE EDUCATION/TRAINING PROGRAM

## 2022-10-24 PROCEDURE — 96374 THER/PROPH/DIAG INJ IV PUSH: CPT

## 2022-10-24 PROCEDURE — 83690 ASSAY OF LIPASE: CPT

## 2022-10-24 PROCEDURE — 6360000002 HC RX W HCPCS: Performed by: NURSE PRACTITIONER

## 2022-10-24 PROCEDURE — 2580000003 HC RX 258: Performed by: NURSE PRACTITIONER

## 2022-10-24 PROCEDURE — 80053 COMPREHEN METABOLIC PANEL: CPT

## 2022-10-24 PROCEDURE — 6360000002 HC RX W HCPCS: Performed by: STUDENT IN AN ORGANIZED HEALTH CARE EDUCATION/TRAINING PROGRAM

## 2022-10-24 PROCEDURE — 74177 CT ABD & PELVIS W/CONTRAST: CPT

## 2022-10-24 PROCEDURE — 82077 ASSAY SPEC XCP UR&BREATH IA: CPT

## 2022-10-24 PROCEDURE — 6360000004 HC RX CONTRAST MEDICATION: Performed by: NURSE PRACTITIONER

## 2022-10-24 PROCEDURE — 80307 DRUG TEST PRSMV CHEM ANLYZR: CPT

## 2022-10-24 PROCEDURE — 51702 INSERT TEMP BLADDER CATH: CPT

## 2022-10-24 PROCEDURE — 81001 URINALYSIS AUTO W/SCOPE: CPT

## 2022-10-24 PROCEDURE — 36415 COLL VENOUS BLD VENIPUNCTURE: CPT

## 2022-10-24 PROCEDURE — 71045 X-RAY EXAM CHEST 1 VIEW: CPT

## 2022-10-24 PROCEDURE — 99285 EMERGENCY DEPT VISIT HI MDM: CPT

## 2022-10-24 RX ORDER — ENOXAPARIN SODIUM 100 MG/ML
40 INJECTION SUBCUTANEOUS DAILY
Status: DISCONTINUED | OUTPATIENT
Start: 2022-10-25 | End: 2022-10-27 | Stop reason: HOSPADM

## 2022-10-24 RX ORDER — SODIUM CHLORIDE 9 MG/ML
INJECTION, SOLUTION INTRAVENOUS PRN
Status: DISCONTINUED | OUTPATIENT
Start: 2022-10-24 | End: 2022-10-27 | Stop reason: HOSPADM

## 2022-10-24 RX ORDER — 0.9 % SODIUM CHLORIDE 0.9 %
1000 INTRAVENOUS SOLUTION INTRAVENOUS ONCE
Status: COMPLETED | OUTPATIENT
Start: 2022-10-24 | End: 2022-10-24

## 2022-10-24 RX ORDER — ACETAMINOPHEN 650 MG/1
650 SUPPOSITORY RECTAL EVERY 6 HOURS PRN
Status: DISCONTINUED | OUTPATIENT
Start: 2022-10-24 | End: 2022-10-27 | Stop reason: HOSPADM

## 2022-10-24 RX ORDER — SODIUM CHLORIDE 0.9 % (FLUSH) 0.9 %
5-40 SYRINGE (ML) INJECTION EVERY 12 HOURS SCHEDULED
Status: DISCONTINUED | OUTPATIENT
Start: 2022-10-24 | End: 2022-10-27 | Stop reason: HOSPADM

## 2022-10-24 RX ORDER — ACETAMINOPHEN 325 MG/1
650 TABLET ORAL EVERY 6 HOURS PRN
Status: DISCONTINUED | OUTPATIENT
Start: 2022-10-24 | End: 2022-10-27 | Stop reason: HOSPADM

## 2022-10-24 RX ORDER — SODIUM CHLORIDE 0.9 % (FLUSH) 0.9 %
5-40 SYRINGE (ML) INJECTION PRN
Status: DISCONTINUED | OUTPATIENT
Start: 2022-10-24 | End: 2022-10-27 | Stop reason: HOSPADM

## 2022-10-24 RX ORDER — ONDANSETRON 2 MG/ML
4 INJECTION INTRAMUSCULAR; INTRAVENOUS ONCE
Status: COMPLETED | OUTPATIENT
Start: 2022-10-24 | End: 2022-10-24

## 2022-10-24 RX ORDER — ONDANSETRON 2 MG/ML
4 INJECTION INTRAMUSCULAR; INTRAVENOUS EVERY 6 HOURS PRN
Status: DISCONTINUED | OUTPATIENT
Start: 2022-10-24 | End: 2022-10-27 | Stop reason: HOSPADM

## 2022-10-24 RX ORDER — SODIUM CHLORIDE 9 MG/ML
INJECTION, SOLUTION INTRAVENOUS CONTINUOUS
Status: ACTIVE | OUTPATIENT
Start: 2022-10-24 | End: 2022-10-25

## 2022-10-24 RX ADMIN — SODIUM CHLORIDE 1000 ML: 9 INJECTION, SOLUTION INTRAVENOUS at 19:21

## 2022-10-24 RX ADMIN — ONDANSETRON 4 MG: 2 INJECTION INTRAMUSCULAR; INTRAVENOUS at 22:26

## 2022-10-24 RX ADMIN — ONDANSETRON 4 MG: 2 INJECTION INTRAMUSCULAR; INTRAVENOUS at 18:15

## 2022-10-24 RX ADMIN — IOPAMIDOL 75 ML: 755 INJECTION, SOLUTION INTRAVENOUS at 18:43

## 2022-10-24 RX ADMIN — SODIUM CHLORIDE: 9 INJECTION, SOLUTION INTRAVENOUS at 20:38

## 2022-10-24 RX ADMIN — Medication 10 ML: at 23:56

## 2022-10-24 ASSESSMENT — PAIN SCALES - GENERAL
PAINLEVEL_OUTOF10: 10
PAINLEVEL_OUTOF10: 6
PAINLEVEL_OUTOF10: 6

## 2022-10-24 ASSESSMENT — PAIN DESCRIPTION - LOCATION
LOCATION: ABDOMEN

## 2022-10-24 ASSESSMENT — PAIN - FUNCTIONAL ASSESSMENT
PAIN_FUNCTIONAL_ASSESSMENT: 0-10
PAIN_FUNCTIONAL_ASSESSMENT: ACTIVITIES ARE NOT PREVENTED
PAIN_FUNCTIONAL_ASSESSMENT: 0-10
PAIN_FUNCTIONAL_ASSESSMENT: PREVENTS OR INTERFERES SOME ACTIVE ACTIVITIES AND ADLS
PAIN_FUNCTIONAL_ASSESSMENT: ACTIVITIES ARE NOT PREVENTED
PAIN_FUNCTIONAL_ASSESSMENT: 0-10

## 2022-10-24 ASSESSMENT — PAIN DESCRIPTION - PAIN TYPE
TYPE: ACUTE PAIN
TYPE: ACUTE PAIN

## 2022-10-24 ASSESSMENT — PAIN DESCRIPTION - DESCRIPTORS
DESCRIPTORS: CRAMPING
DESCRIPTORS: CRAMPING

## 2022-10-24 ASSESSMENT — PAIN DESCRIPTION - ONSET
ONSET: ON-GOING
ONSET: ON-GOING

## 2022-10-24 ASSESSMENT — PAIN DESCRIPTION - ORIENTATION
ORIENTATION: RIGHT;LOWER
ORIENTATION: RIGHT;LOWER

## 2022-10-24 ASSESSMENT — PAIN DESCRIPTION - FREQUENCY
FREQUENCY: INTERMITTENT
FREQUENCY: INTERMITTENT

## 2022-10-24 NOTE — H&P
Hospital Medicine History & Physical      PCP: . None (Inactive)    Date of Admission: 10/24/2022    Date of Service: Pt seen/examined on 10/24/2022 and admitted to inpatient    Chief Complaint: Nausea and vomiting, previously had diarrhea      History Of Present Illness: The patient is a 44 y.o. male with no major past medical history who presents to [facility] with initial concern that 1 week prior he was having persistent intermittent occurrences of diarrhea but no other symptoms of note and then took an over-the-counter antidiarrheal agent and so it resolved but then over the last few days he has unfortunately had recurrent intermittent nausea and vomiting and epigastric abdominal pain. He has had some previous diagnosis of gallstones in the past but uncertain if this is entirely related to his current symptoms because he has not had any other recent GI symptoms up until now over the last week. Apparently patient came to the ED for further evaluation and treatment, but was noted that he went to the bathroom while receiving work-up in the ED and then apparently nursing had apparently found him unresponsive inside the bathroom. He was immediately rushed to the bed and given Narcan and so this resolved his unresponsive nature. He is currently still on nasal cannula oxygen, potentially from some aspiration or still from being somewhat somnolent but at this time he is very alert and oriented and able to give me further details. He is very apologetic for the incident. Apart from the above symptoms that are noted, he denies that his friends or family are afflicted with any similar symptoms.   Over the course of the last week or so with the diarrhea and the nausea and vomiting, he denies any other recent symptoms of fever, chills, dysuria, dizziness, syncope, chest pain, shortness of breath, blood in urine/stool/sputum/vomitus, leg swelling, rashes    Past Medical History:    No past medical history on file. Past Surgical History:        Procedure Laterality Date    ANKLE SURGERY         Medications Prior to Admission:    Prior to Admission medications    Medication Sig Start Date End Date Taking? Authorizing Provider   dicyclomine (BENTYL) 10 MG capsule Take 1 capsule by mouth every 6 hours as needed (cramps)  Patient not taking: Reported on 10/24/2022 10/16/22   Nic Villavicencio MD   famotidine (PEPCID) 20 MG tablet Take 1 tablet by mouth 2 times daily  Patient not taking: Reported on 10/24/2022 10/16/22   Nic Villavicencio MD   ondansetron (ZOFRAN ODT) 4 MG disintegrating tablet Take 1 tablet by mouth every 8 hours as needed for Nausea  Patient not taking: Reported on 10/24/2022 10/16/22   Nic Villavicencio MD       Allergies:  Depakote [divalproex sodium] and Pcn [penicillins]    Social History:  The patient currently lives home    TOBACCO:   reports that he has quit smoking. He has never used smokeless tobacco.  ETOH:   reports current alcohol use. Family History:  Reviewed in detail and negative for DM, Early CAD, Cancer, CVA. Positive as follows:        Problem Relation Age of Onset    Cancer Other     Diabetes Other     High Blood Pressure Other     Stroke Other        REVIEW OF SYSTEMS:    and as noted in the HPI. All other systems reviewed and negative. PHYSICAL EXAM:    /81   Pulse 79   Temp 98.5 °F (36.9 °C) (Oral)   Resp 18   Ht 5' 8\" (1.727 m)   Wt 191 lb 2.2 oz (86.7 kg)   SpO2 97%   BMI 29.06 kg/m²     General appearance: Currently on nasal cannula oxygen, no acute respiratory distress, somewhat pressured speech but overall alert and oriented x4  HEENT Normal cephalic, atraumatic without obvious deformity. Pupils equal, round, and reactive to light. Extra ocular muscles intact.   No nystagmus, mildly dry mucous membranes, anicteric sclera  Neck: Supple, no JVD  Lungs: Clear breath sounds bilaterally on exam  Heart: Regular rate and rhythm, no murmurs detected  Abdomen: Soft, nontender, nondistended, active bowel sounds at this time  Extremities: No edema  Skin: No rashes  Neurologic: Grossly intact neurologically, moves all extremities with 5 of 5 strength  Mental status: Alert, oriented, thought content appropriate. Capillary Refill: Acceptable  < 3 seconds  Peripheral Pulses: +3 Easily felt, not easily obliterated with pressure      10/24/22 2232  XR CHEST PORTABLE   Performed: 10/24/22 2133  Final        Impression: Mild cardiomegaly which is more prominent with no acute pulmonary abnormality. 10/24/22 1902  CT ABDOMEN PELVIS W IV CONTRAST Additional Contrast? None   Performed: 10/24/22 1842  Final        Impression: 1. Decreased size and conspicuity of previously described prominent periportal lymph nodes. 2. Mild hepatic steatosis. Consider correlation with LFTs. CBC   Recent Labs     10/24/22  1424   WBC 11.7*   HGB 14.8   HCT 45.0         RENAL  Recent Labs     10/24/22  1424 10/25/22  0515    135*   K 3.9 3.6   CL 88* 95*   CO2 33* 31   BUN 22* 16   CREATININE 2.1* 1.4*     LFT'S  Recent Labs     10/24/22  1424 10/25/22  0515   AST 64* 44*   ALT 86* 63*   BILITOT 0.8 0.7   ALKPHOS 59 42     COAG  No results for input(s): INR in the last 72 hours. CARDIAC ENZYMES  No results for input(s): CKTOTAL, CKMB, CKMBINDEX, TROPONINI in the last 72 hours.     U/A:    Lab Results   Component Value Date/Time    COLORU Yellow 10/24/2022 07:40 PM    WBCUA 1 10/24/2022 07:40 PM    RBCUA 0 10/24/2022 07:40 PM    MUCUS Trace 10/20/2010 05:15 PM    BACTERIA None Seen 10/24/2022 07:40 PM    CLARITYU Clear 10/24/2022 07:40 PM    SPECGRAV 1.047 10/24/2022 07:40 PM    LEUKOCYTESUR Negative 10/24/2022 07:40 PM    BLOODU Negative 10/24/2022 07:40 PM    GLUCOSEU Negative 10/24/2022 07:40 PM    GLUCOSEU NEGATIVE 05/11/2011 11:45 PM       ABG  No results found for: MGX6QNC, BEART, G4THULDE, PHART, THGBART, BWU8QQA, PO2ART, DHR7DHL        Active Hospital Problems    Diagnosis Date Noted    AMS (altered mental status) [R41.82] 10/24/2022     Priority: Medium    STEVEN (acute kidney injury) ([facility] [address] [N17.9] 10/24/2022     Priority: Medium    Abdominal pain [R10.9] 10/24/2022     Priority: Medium    Hypoxia [R09.02] 10/24/2022     Priority: Medium         PHYSICIANS CERTIFICATION:    I certify that Urban Jostin is expected to be hospitalized for greater than 2 midnights based on the following assessment and plan:      ASSESSMENT/PLAN:  Acute kidney injury  Abdominal pain  Altered mental status  Hypoxia    Plan:  Patient came for GI symptoms of nausea and vomiting, previously had diarrhea and had used an NT motility agent, apparently also had an episode where he was in the bathroom and he admits to me that he was using an illicit substance that may have caused him to go unresponsive, he does not want to engage in this activity at this time and wants to be treated and agrees to follow rules in the hospital  Currently patient does not seem to have any abdominal pain or other GI symptoms at this time, I do suspect that likely patient's STEVEN and abdominal symptoms are viral in origin as I do not see any signs of bacterial infection. Patient is slightly hypoxic but at this time I feel as though he may have some slightly aspirated but will likely recover  No antibiotics for now, monitor for any fevers any worsening signs of infection  UDS positive for cannabis, cocaine, opiates, fentanyl  Start patient on normal saline 100/h x 10 hours for IV fluid hydration  Avoid any nephrotoxins  Monitor for any worsening hypoxia  Repeat labs in the morning  Obtain right upper quadrant ultrasound in the morning to evaluate for other causes of patient's abdominal pain and nausea and vomiting      DVT Prophylaxis: Lovenox  Diet: ADULT DIET;  Clear Liquid  Code Status: Full Code  PT/OT Eval Status: Ambulatory    Dispo -pending clinical course       Alek Crockett, DO    Thank you . None (Inactive) for the opportunity to be involved in this patient's care. If you have any questions or concerns please feel free to contact me at [phone].

## 2022-10-24 NOTE — ED NOTES
Pt became unresponsive, 2mg of narcan given to pt nasal, pt is now responsive denies drug use , Arrington Press NP witnessed       Elliot Lee, JADA  10/24/22 6409

## 2022-10-24 NOTE — PROGRESS NOTES
Pharmacy Medication Reconciliation Note     List of medications Brady Boas is currently taking is complete. Source of information:   1. Conversation with patient at bedside  2. EMR    Notes regarding home medications:   1. Patient prescribed dicyclomine, famotidine, and [facility]-- has not started to taking any of those prescriptions as of today.  Patient denies use of any OTC/herbal medication    Satya Asif, Pharmacy Intern  10/24/2022  7:53 PM

## 2022-10-24 NOTE — ED PROVIDER NOTES
[address] [facility]  [address] [address]  Dept: [phone]  Loc: [address] ENCOUNTER        This patient was not seen or evaluated by the attending physician. I evaluated this patient, the attending physician was available for consultation. CHIEF COMPLAINT    Chief Complaint   Patient presents with    Abdominal Pain     Pt states that \" it's my gall bladder, for 9 days\" pt states that he can't eat or drink anything pt was outside smoking and drinking Gatorade        HPI    Corie Holcomb is a 44 y.o. male who presents to the emergency department with a 1 week complaint of right upper quadrant abdominal pain. Patient states that he was evaluated last Monday for abdominal pain in the emergency department and was instructed to follow-up with an outpatient test the next day which he did not do. He states he has been vomiting the whole time. He has never had this before. He denies lightheadedness, dizziness, shortness of breath, chest pain, diarrhea or constipation. He denies marijuana smoking or alcohol drinking or other drug use. He states he has never had this before. REVIEW OF SYSTEMS    GI: see HPI, + vomiting, no diarrhea, no hematochezia  Cardiac: No chest pain, shortness of breath, palpitations or syncope  Pulmonary: No difficulty breathing or new cough  General: No fevers  : No dysuria, No hematuria  See HPI for further details. All other systems reviewed and are negative. PAST MEDICAL & SURGICAL HISTORY    No past medical history on file.   Past Surgical History:   Procedure Laterality Date    ANKLE SURGERY         CURRENT MEDICATIONS  (may include discharge medications prescribed in the ED)  Current Outpatient Rx   Medication Sig Dispense Refill    dicyclomine (BENTYL) 10 MG capsule Take 1 capsule by mouth every 6 hours as needed (cramps) 20 capsule 0    famotidine (PEPCID) 20 MG tablet Take 1 tablet by mouth 2 times daily 60 tablet 0    ondansetron (ZOFRAN ODT) 4 MG disintegrating tablet Take 1 tablet by mouth every 8 hours as needed for Nausea 20 tablet 0       ALLERGIES    Allergies   Allergen Reactions    Depakote [Divalproex Sodium] Hives and Swelling    Pcn [Penicillins] Swelling       SOCIAL AND FAMILY HISTORY    Social History     Socioeconomic History    Marital status: Single    Number of children: 1   Occupational History    Occupation: Unemployed   Tobacco Use    Smoking status: Former    Smokeless tobacco: Never    Tobacco comments:     10/16/22 vapes   Vaping Use    Vaping Use: Every day    Substances: Nicotine, Flavoring    Devices: Pre-filled or refillable cartridge   Substance and Sexual Activity    Alcohol use: Yes     Comment: 10/16/22 beer once every month    Drug use: No     Family History   Problem Relation Age of Onset    Cancer Other     Diabetes Other     High Blood Pressure Other     Stroke Other        PHYSICAL EXAM    VITAL SIGNS: /77   Pulse 94   Temp 98.4 °F (36.9 °C) (Oral)   Resp 10   Ht 5' 8\" (1.727 m)   Wt 201 lb 1 oz (91.2 kg)   SpO2 99%   BMI 30.57 kg/m²   Constitutional:  Well developed, well nourished, no acute distress  Eyes:  Sclera nonicteric, conjunctiva moist  HENT:  Atraumatic, nose normal  Neck: no JVD  Respiratory:  No retractions, no accessory muscle use, normal breath sounds   Cardiovascular: Tachycardic rate with a regular rhythm. S1-S2. No murmur  GI: Abdomen is soft and nondistended with right upper quadrant abdominal tenderness to palpation. No rebound or guarding. No audible bruits or palpable pulsatile masses.   Back: no CVA tenderness  Musculoskeletal:  No edema, no acute deformities  Vascular: DP pulses 2+ and equal bilaterally  Integument: No rashes, skin dry  Neurologic:  Alert & oriented, no slurred speech  Psychiatric: Cooperative, pleasant affect     RADIOLOGY/PROCEDURES    CT ABDOMEN PELVIS W IV CONTRAST Additional Contrast? None   Final Result   1. Decreased size and conspicuity of previously described prominent   periportal lymph nodes. 2.  Mild hepatic steatosis. Consider correlation with LFTs. Labs Reviewed   CBC WITH AUTO DIFFERENTIAL - Abnormal; Notable for the following components:       Result Value    WBC 11.7 (*)     Neutrophils Absolute 9.9 (*)     All other components within normal limits   COMPREHENSIVE METABOLIC PANEL - Abnormal; Notable for the following components:    Chloride 88 (*)     CO2 33 (*)     Glucose 178 (*)     BUN 22 (*)     Creatinine 2.1 (*)     Est, Glom Filt Rate 40 (*)     Total Protein 8.5 (*)     Albumin 5.1 (*)     ALT 86 (*)     AST 64 (*)     All other components within normal limits   LACTATE, SEPSIS - Abnormal; Notable for the following components:    Lactic Acid, Sepsis 2.0 (*)     All other components within normal limits   URINALYSIS WITH REFLEX TO CULTURE - Abnormal; Notable for the following components:    Ketones, Urine TRACE (*)     Protein, UA TRACE (*)     All other components within normal limits   URINE DRUG SCREEN - Abnormal; Notable for the following components:    Cannabinoid Scrn, Ur POSITIVE (*)     Cocaine Metabolite Screen, Urine POSITIVE (*)     Opiate Scrn, Ur POSITIVE (*)     FENTANYL SCREEN, URINE POSITIVE (*)     All other components within normal limits   LIPASE   ETHANOL   MICROSCOPIC URINALYSIS   LACTATE, SEPSIS       ED COURSE & MEDICAL DECISION MAKING    Pertinent Labs & Imaging studies reviewed and interpreted. (See chart for details)     See chart for details of medications given during the ED stay.     Vitals:    10/24/22 1930 10/24/22 2000 10/24/22 2035 10/24/22 2100   BP: 138/85 137/79 128/77 124/77   Pulse: (!) 104 98 (!) 102 94   Resp: 14 23 27 10   Temp:       TempSrc:       SpO2: 99% 100% 99% 99%   Weight:       Height:         Medications   0.9 % sodium chloride infusion ( IntraVENous New Bag 10/24/22 2038) ondansetron [facility]) injection 4 mg (4 mg IntraVENous Given 10/24/22 1815)   0.9 % sodium chloride bolus (0 mLs IntraVENous Stopped 10/24/22 2039)   0.9 % sodium chloride bolus (0 mLs IntraVENous Stopped 10/24/22 2039)   iopamidol (ISOVUE-370) 76 % injection 75 mL (75 mLs IntraVENous Given 10/24/22 1843)   '  I have seen and evaluated this patient. My attending physician was available for consultation. Differential diagnosis: Abdominal Aortic Aneurysm, Acute Coronary Syndrome, Ischemic Bowel, Bowel Obstruction (including Gastric Outlet Obstruction), PUD, GERD, Acute Cholecystitis, Pancreatitis, Hepatitis, Colitis, SMA Syndrome, Mesenteric Steal Syndrome, Splanchnic Vein Thrombosis, Appendicitis, Diverticulitis, Pyelonephritis, UTI, STD, Gonad Torsion, other    He is nontoxic in appearance. He is afebrile. He is complaining of right upper quadrant abdominal pain for a week. Evaluated last Monday. Today his labs reveal a white count of 11.7 with no anemia    He had a sodium of 137 with a potassium of 3.9, chloride 88, CO2 33, BUN 22, creatinine 2.1 with a GFR 40. His labs on October 16 with a chloride of 105, CO2 30, BUN 8 and a creatinine of 1.1. Lactic acid is 2    Serum glucose 178    ALT 86, AST 64, lipase 14    October 16 hepatitis panel reveals a reactive hepatitis C      The patient was here for several hours before being picked up by me. At the start of my shift I was asked to see this patient which I did. He did not have an IV in at that time. After I saw him I placed orders to start an IV due to an STEVEN and needing a scan. After that point apparently he went to the bathroom and came out and within 2 minutes per the nursing staff he became unresponsive. He did not respond to my sternal rub. He had pinpoint pupils. His pulse ox was 52% on room air. He was gray with oral cyanosis and his nailbeds were blue.   I ordered for the nurse to give him Narcan and within 60 seconds he responded to intranasal Narcan. He denied taking anything. He was placed on oxygen and moved to a room. Nursing staff states that he was acting normally before his IV was placed and I had no suspicious concerns about this patient with my initial contact. Given 4 mg of intranasal Narcan. I ordered for the patient to have a urinalysis with a urine drug screen    Ct of the abdomen pelvis with IV contrast showed  Peritoneum/Retroperitoneum: Mildly prominent periportal lymph nodes again   demonstrated. A reference lymph node measures 1.0 cm (series 2, image 49),   previously 1.3 cm. No retroperitoneal or pelvic lymphadenopathy. No free   fluid or free intraperitoneal gas. The abdominal aorta is normal in caliber. Bones/Soft Tissues: No acute osseous or soft tissue abnormality. Tiny fat   containing umbilical hernia. Impression   1. Decreased size and conspicuity of previously described prominent   periportal lymph nodes. 2.  Mild hepatic steatosis. Consider correlation with LFTs. The patient will be admitted to the hospitalist service for STEVEN. Evaluated this patient multiple times. Has returned back to normal however he continues to be hypoxic but mentating appropriately. They placed him on O2 at 2 L per nasal cannula. He continues to deny taking anything while he was in the bathroom. Urine drug screen is pending at the time of this dictation. 2115 urine drug screen is positive for cocaine, fentanyl, opiates and cannabinoids. Hospitalist made aware. CRITICAL CARE NOTE:  There was a high probability of clinically significant life-threatening deterioration of the patient's condition requiring my urgent intervention. I personally saw the patient and independently provided 45 minutes of critical care time.     This includes multiple reevaluations, vital sign monitoring, pulse oximetry monitoring, telemetry monitoring, clinical response to the IV medications, reviewing the nursing notes, consultation time, dictation/documentation time, and interpretation of the labwork. (This time excludes time spent performing procedures). FINAL IMPRESSION    1. STEVEN (acute kidney injury) ([facility] [address]    2. Unresponsive episode    3. Abdominal pain, right upper quadrant    4. Hepatitis C virus infection without hepatic coma, unspecified chronicity    5. Hypochloremia    6. Polysubstance abuse ([facility] [address]    7.  Accidental drug overdose, initial encounter        PLAN  Admission    (Please note that this note was completed with a voice recognition program.  Every attempt was made to edit the dictations, but inevitably there remain words that are mis-transcribed.)       Klaudia Rodriguez, LAURA - CIRO  10/24/22 0454

## 2022-10-24 NOTE — LETTER
[address]  Phone: [phone]    No name on file. October 27, 2022     Patient: Evangelina Mo   YOB: 1985   Date of Visit: 10/24/2022       To Whom It May Concern: It is my medical opinion that Evangelina Mo may return to work on 10/27/2022. Admitted 10/24/2022- 10/27/2022    If you have any questions or concerns, please don't hesitate to call. Sincerely,        No name on file.

## 2022-10-24 NOTE — PROGRESS NOTES
Pharmacy Medication Reconciliation Note     List of medications Hannah Morales is currently taking is complete. Source of information:   1. Conversation with patient at bedside  2. EMR    Notes regarding home medications:   1. Patient prescribed dicyclomine, famotidine, and [facility]-- has not started to taking any of those prescriptions as of today.  Patient denies use of any OTC/herbal medication    Sarah Lynne, Pharmacy Intern  10/24/2022  7:53 PM

## 2022-10-25 ENCOUNTER — APPOINTMENT (OUTPATIENT)
Dept: GENERAL RADIOLOGY | Age: 37
DRG: 391 | End: 2022-10-25
Payer: MEDICAID

## 2022-10-25 ENCOUNTER — APPOINTMENT (OUTPATIENT)
Dept: ULTRASOUND IMAGING | Age: 37
DRG: 391 | End: 2022-10-25
Payer: MEDICAID

## 2022-10-25 LAB
A/G RATIO: 1.6 (ref 1.1–2.2)
ALBUMIN SERPL-MCNC: 4 G/DL (ref 3.4–5)
ALP BLD-CCNC: 42 U/L (ref 40–129)
ALT SERPL-CCNC: 63 U/L (ref 10–40)
ANION GAP SERPL CALCULATED.3IONS-SCNC: 9 MMOL/L (ref 3–16)
AST SERPL-CCNC: 44 U/L (ref 15–37)
BASOPHILS ABSOLUTE: 0 K/UL (ref 0–0.2)
BASOPHILS RELATIVE PERCENT: 0.3 %
BILIRUB SERPL-MCNC: 0.7 MG/DL (ref 0–1)
BUN BLDV-MCNC: 16 MG/DL (ref 7–20)
CALCIUM SERPL-MCNC: 8 MG/DL (ref 8.3–10.6)
CHLORIDE BLD-SCNC: 95 MMOL/L (ref 99–110)
CO2: 31 MMOL/L (ref 21–32)
CREAT SERPL-MCNC: 1.4 MG/DL (ref 0.9–1.3)
EOSINOPHILS ABSOLUTE: 0 K/UL (ref 0–0.6)
EOSINOPHILS RELATIVE PERCENT: 0.2 %
FERRITIN: 57.8 NG/ML (ref 30–400)
GFR SERPL CREATININE-BSD FRML MDRD: >60 ML/MIN/{1.73_M2}
GLUCOSE BLD-MCNC: 131 MG/DL (ref 70–99)
HCT VFR BLD CALC: 37.5 % (ref 40.5–52.5)
HEMOGLOBIN: 12.7 G/DL (ref 13.5–17.5)
INR BLD: 1.08 (ref 0.87–1.14)
IRON SATURATION: 17 % (ref 20–50)
IRON: 65 UG/DL (ref 59–158)
LACTIC ACID, SEPSIS: 0.8 MMOL/L (ref 0.4–1.9)
LYMPHOCYTES ABSOLUTE: 3 K/UL (ref 1–5.1)
LYMPHOCYTES RELATIVE PERCENT: 22.1 %
MAGNESIUM: 2.1 MG/DL (ref 1.8–2.4)
MCH RBC QN AUTO: 29.1 PG (ref 26–34)
MCHC RBC AUTO-ENTMCNC: 33.9 G/DL (ref 31–36)
MCV RBC AUTO: 85.8 FL (ref 80–100)
MONOCYTES ABSOLUTE: 1.2 K/UL (ref 0–1.3)
MONOCYTES RELATIVE PERCENT: 9 %
NEUTROPHILS ABSOLUTE: 9.2 K/UL (ref 1.7–7.7)
NEUTROPHILS RELATIVE PERCENT: 68.4 %
PDW BLD-RTO: 14.5 % (ref 12.4–15.4)
PLATELET # BLD: 209 K/UL (ref 135–450)
PMV BLD AUTO: 10.3 FL (ref 5–10.5)
POTASSIUM SERPL-SCNC: 3.6 MMOL/L (ref 3.5–5.1)
PROTHROMBIN TIME: 14 SEC (ref 11.7–14.5)
RBC # BLD: 4.37 M/UL (ref 4.2–5.9)
SODIUM BLD-SCNC: 135 MMOL/L (ref 136–145)
TOTAL IRON BINDING CAPACITY: 372 UG/DL (ref 260–445)
TOTAL PROTEIN: 6.5 G/DL (ref 6.4–8.2)
WBC # BLD: 13.4 K/UL (ref 4–11)

## 2022-10-25 PROCEDURE — 6370000000 HC RX 637 (ALT 250 FOR IP): Performed by: NURSE PRACTITIONER

## 2022-10-25 PROCEDURE — 86038 ANTINUCLEAR ANTIBODIES: CPT

## 2022-10-25 PROCEDURE — 80053 COMPREHEN METABOLIC PANEL: CPT

## 2022-10-25 PROCEDURE — 83550 IRON BINDING TEST: CPT

## 2022-10-25 PROCEDURE — 83735 ASSAY OF MAGNESIUM: CPT

## 2022-10-25 PROCEDURE — 82103 ALPHA-1-ANTITRYPSIN TOTAL: CPT

## 2022-10-25 PROCEDURE — 82728 ASSAY OF FERRITIN: CPT

## 2022-10-25 PROCEDURE — 94669 MECHANICAL CHEST WALL OSCILL: CPT

## 2022-10-25 PROCEDURE — 83540 ASSAY OF IRON: CPT

## 2022-10-25 PROCEDURE — 76705 ECHO EXAM OF ABDOMEN: CPT

## 2022-10-25 PROCEDURE — 85025 COMPLETE CBC W/AUTO DIFF WBC: CPT

## 2022-10-25 PROCEDURE — 6360000002 HC RX W HCPCS: Performed by: STUDENT IN AN ORGANIZED HEALTH CARE EDUCATION/TRAINING PROGRAM

## 2022-10-25 PROCEDURE — 85610 PROTHROMBIN TIME: CPT

## 2022-10-25 PROCEDURE — 36415 COLL VENOUS BLD VENIPUNCTURE: CPT

## 2022-10-25 PROCEDURE — 2580000003 HC RX 258: Performed by: STUDENT IN AN ORGANIZED HEALTH CARE EDUCATION/TRAINING PROGRAM

## 2022-10-25 PROCEDURE — 82390 ASSAY OF CERULOPLASMIN: CPT

## 2022-10-25 PROCEDURE — 2580000003 HC RX 258: Performed by: NURSE PRACTITIONER

## 2022-10-25 PROCEDURE — 82105 ALPHA-FETOPROTEIN SERUM: CPT

## 2022-10-25 PROCEDURE — 1200000000 HC SEMI PRIVATE

## 2022-10-25 PROCEDURE — C9113 INJ PANTOPRAZOLE SODIUM, VIA: HCPCS | Performed by: INTERNAL MEDICINE

## 2022-10-25 PROCEDURE — 86015 ACTIN ANTIBODY EACH: CPT

## 2022-10-25 PROCEDURE — 94760 N-INVAS EAR/PLS OXIMETRY 1: CPT

## 2022-10-25 PROCEDURE — 83516 IMMUNOASSAY NONANTIBODY: CPT

## 2022-10-25 PROCEDURE — 71046 X-RAY EXAM CHEST 2 VIEWS: CPT

## 2022-10-25 PROCEDURE — 6360000002 HC RX W HCPCS: Performed by: INTERNAL MEDICINE

## 2022-10-25 PROCEDURE — 81256 HFE GENE: CPT

## 2022-10-25 RX ORDER — NICOTINE 21 MG/24HR
1 PATCH, TRANSDERMAL 24 HOURS TRANSDERMAL DAILY
Status: DISCONTINUED | OUTPATIENT
Start: 2022-10-25 | End: 2022-10-27 | Stop reason: HOSPADM

## 2022-10-25 RX ORDER — PANTOPRAZOLE SODIUM 40 MG/1
40 TABLET, DELAYED RELEASE ORAL
Status: DISCONTINUED | OUTPATIENT
Start: 2022-10-25 | End: 2022-10-25

## 2022-10-25 RX ORDER — SODIUM CHLORIDE 9 MG/ML
INJECTION, SOLUTION INTRAVENOUS CONTINUOUS
Status: ACTIVE | OUTPATIENT
Start: 2022-10-25 | End: 2022-10-25

## 2022-10-25 RX ORDER — METHOCARBAMOL 750 MG/1
750 TABLET, FILM COATED ORAL 3 TIMES DAILY
Status: DISCONTINUED | OUTPATIENT
Start: 2022-10-25 | End: 2022-10-27 | Stop reason: HOSPADM

## 2022-10-25 RX ORDER — PANTOPRAZOLE SODIUM 40 MG/10ML
40 INJECTION, POWDER, LYOPHILIZED, FOR SOLUTION INTRAVENOUS 2 TIMES DAILY
Status: DISCONTINUED | OUTPATIENT
Start: 2022-10-25 | End: 2022-10-27 | Stop reason: HOSPADM

## 2022-10-25 RX ORDER — CALCIUM CARBONATE 200(500)MG
500 TABLET,CHEWABLE ORAL 3 TIMES DAILY PRN
Status: DISCONTINUED | OUTPATIENT
Start: 2022-10-25 | End: 2022-10-27 | Stop reason: HOSPADM

## 2022-10-25 RX ORDER — OXYCODONE HYDROCHLORIDE 5 MG/1
5 TABLET ORAL EVERY 6 HOURS PRN
Status: DISCONTINUED | OUTPATIENT
Start: 2022-10-25 | End: 2022-10-27 | Stop reason: HOSPADM

## 2022-10-25 RX ADMIN — METHOCARBAMOL TABLETS 750 MG: 750 TABLET, COATED ORAL at 20:16

## 2022-10-25 RX ADMIN — OXYCODONE 5 MG: 5 TABLET ORAL at 12:27

## 2022-10-25 RX ADMIN — ANTACID TABLETS 500 MG: 500 TABLET, CHEWABLE ORAL at 20:16

## 2022-10-25 RX ADMIN — SODIUM CHLORIDE: 9 INJECTION, SOLUTION INTRAVENOUS at 12:23

## 2022-10-25 RX ADMIN — ONDANSETRON 4 MG: 2 INJECTION INTRAMUSCULAR; INTRAVENOUS at 03:06

## 2022-10-25 RX ADMIN — ONDANSETRON 4 MG: 2 INJECTION INTRAMUSCULAR; INTRAVENOUS at 09:36

## 2022-10-25 RX ADMIN — Medication 10 ML: at 09:30

## 2022-10-25 RX ADMIN — ONDANSETRON 4 MG: 2 INJECTION INTRAMUSCULAR; INTRAVENOUS at 16:21

## 2022-10-25 RX ADMIN — PANTOPRAZOLE SODIUM 40 MG: 40 INJECTION, POWDER, FOR SOLUTION INTRAVENOUS at 20:16

## 2022-10-25 RX ADMIN — OXYCODONE 5 MG: 5 TABLET ORAL at 18:40

## 2022-10-25 RX ADMIN — METHOCARBAMOL TABLETS 750 MG: 750 TABLET, COATED ORAL at 14:05

## 2022-10-25 RX ADMIN — ENOXAPARIN SODIUM 40 MG: 100 INJECTION SUBCUTANEOUS at 09:30

## 2022-10-25 RX ADMIN — Medication 10 ML: at 20:17

## 2022-10-25 ASSESSMENT — PAIN DESCRIPTION - ORIENTATION
ORIENTATION: RIGHT
ORIENTATION: RIGHT;MID

## 2022-10-25 ASSESSMENT — PAIN DESCRIPTION - DESCRIPTORS
DESCRIPTORS: SHARP;CRAMPING
DESCRIPTORS: CRAMPING;SHARP;STABBING
DESCRIPTORS: CRAMPING
DESCRIPTORS: CRAMPING;ACHING;SHARP

## 2022-10-25 ASSESSMENT — PAIN - FUNCTIONAL ASSESSMENT
PAIN_FUNCTIONAL_ASSESSMENT: ACTIVITIES ARE NOT PREVENTED

## 2022-10-25 ASSESSMENT — PAIN SCALES - GENERAL
PAINLEVEL_OUTOF10: 7
PAINLEVEL_OUTOF10: 0
PAINLEVEL_OUTOF10: 6
PAINLEVEL_OUTOF10: 6
PAINLEVEL_OUTOF10: 7
PAINLEVEL_OUTOF10: 5
PAINLEVEL_OUTOF10: 7

## 2022-10-25 ASSESSMENT — PAIN DESCRIPTION - FREQUENCY: FREQUENCY: CONTINUOUS

## 2022-10-25 ASSESSMENT — PAIN DESCRIPTION - LOCATION
LOCATION: BACK;ABDOMEN
LOCATION: ABDOMEN

## 2022-10-25 ASSESSMENT — PAIN DESCRIPTION - ONSET: ONSET: ON-GOING

## 2022-10-25 ASSESSMENT — PAIN DESCRIPTION - PAIN TYPE: TYPE: ACUTE PAIN

## 2022-10-25 NOTE — PROGRESS NOTES
Patient alert and oriented x4, VSS, sitting up in bed. Patient c/o right sided abdominal pain and nausea, prn given per patient request, see MAR. Patient denies n/,v diarrhea, SOB. Patient states he would like to go to sleep now and requested curtains and door closed for comfort. Bed in lowest and locked position with non-slip socks on, call light in reach.

## 2022-10-25 NOTE — ED NOTES
ED SBAR report provider to [facility]. Patient to be transported to Room 4117 via stretcher by transport tech. Patient transported with bedside cardiac monitor and with IV medications infusing. IV site clean, dry, and intact. MEWS score and pain assessed as 6/10 and documented. Updated patient and family on plan of care.      Asia Aly RN  10/24/22 1200 [facility], RN  10/24/22 9223

## 2022-10-25 NOTE — PROGRESS NOTES
Hospital Medicine Progress Note      Admit Date: 10/24/2022       CC: F/U for n/v/d    HPI: The patient is a 44 y.o. male with no major past medical history who presents to [facility] with initial concern that 1 week prior he was having persistent intermittent occurrences of diarrhea but no other symptoms of note and then took an over-the-counter antidiarrheal agent and so it resolved but then over the last few days he has unfortunately had recurrent intermittent nausea and vomiting and epigastric abdominal pain. He has had some previous diagnosis of gallstones in the past but uncertain if this is entirely related to his current symptoms because he has not had any other recent GI symptoms up until now over the last week. Apparently patient came to the ED for further evaluation and treatment, but was noted that he went to the bathroom while receiving work-up in the ED and then apparently nursing had apparently found him unresponsive inside the bathroom. He was immediately rushed to the bed and given Narcan and so this resolved his unresponsive nature. He is currently still on nasal cannula oxygen, potentially from some aspiration or still from being somewhat somnolent but at this time he is very alert and oriented and able to give me further details. He is very apologetic for the incident. Apart from the above symptoms that are noted, he denies that his friends or family are afflicted with any similar symptoms. Over the course of the last week or so with the diarrhea and the nausea and vomiting, he denies any other recent symptoms of fever, chills, dysuria, dizziness, syncope, chest pain, shortness of breath, blood in urine/stool/sputum/vomitus, leg swelling, rashes       Interval History/Subjective: RUQ ultrasound today - fatty liver. States he does eat a lot of fatty and greasy foods because he is a .  He states his symtoms started with diarrhea and then progressed to abd pain, n/v about 3 days later. Last episode of emesis Monday afternoon at the hospital, no diarrhea for a couple weeks. Denies drinking alcohol daily. No recent antibiotics. No prior abd surgeries, CT neg for acute findings. STEVEN likely due to low po intake, dehydration. On 3L nc with no acute findings CT chest. Will repeat CXR for possible aspiration pneumonia after he took a pain pill and then passed out. States he has had kidney stones in the past years ago. He said he passed them all on his own. He said back then he was drinking a lot of sweet tea. His urine is neg for hematuria or leuks. CXR neg for acute findings. Review of Systems:       The patient denied headaches, visual changes, LOC, SOB, CP, ABD pain, N/V/D, skin changes, new or worsening weakness or neuromuscular deficits. Comprehensive ROS negative except as mentioned above. Past Medical History:    No past medical history on file. Past Surgical History:        Procedure Laterality Date    ANKLE SURGERY         Allergies:  Depakote [divalproex sodium] and Pcn [penicillins]    Past medical and surgical history reviewed. Any changes have been noted. PHYSICAL EXAM:  /81   Pulse 79   Temp 98.5 °F (36.9 °C) (Oral)   Resp 18   Ht 5' 8\" (1.727 m)   Wt 191 lb 2.2 oz (86.7 kg)   SpO2 97%   BMI 29.06 kg/m²     No intake or output data in the 24 hours ending 10/25/22 0850     General appearance:   No apparent distress, appears stated age. Cooperative. HEENT:  Normocephalic, atraumatic. PERRLA. EOMi. Conjunctivae/corneas clear, no icterus, non-injected. Neck: Supple, with full range of motion. No jugular venous distention. Trachea midline. Respiratory:  Normal respiratory effort. Clear to auscultation, bilaterally without Rales/Wheezes/Rhonchi. Cardiovascular:  Regular rate and rhythm without murmurs, rubs or gallops. Abdomen: Soft, non-tender, non-distended, without rebound or guarding. Normal bowel sounds.   Musculoskeletal:  right flank/ CVA tenderness; No clubbing, cyanosis or edema bilaterally. Full range of motion without deformity. Skin: Skin color, texture, turgor normal.  No rashes or lesions. Neurologic:  Neurovascularly intact without any focal sensory/motor deficits. Cranial nerves: II-XII intact, grossly intact. No facial asymmetry, tongue midline. Psychiatric:  Alert and oriented, thought content appropriate  Capillary Refill: Brisk,< 3 seconds   Peripheral Pulses: +2 palpable, equal bilaterally       LABS:    Lab Results   Component Value Date    WBC 13.4 (H) 10/25/2022    HGB 12.7 (L) 10/25/2022    HCT 37.5 (L) 10/25/2022    MCV 85.8 10/25/2022     10/25/2022    LYMPHOPCT 22.1 10/25/2022    RBC 4.37 10/25/2022    MCH 29.1 10/25/2022    MCHC 33.9 10/25/2022    RDW 14.5 10/25/2022       Lab Results   Component Value Date    CREATININE 1.4 (H) 10/25/2022    BUN 16 10/25/2022     (L) 10/25/2022    K 3.6 10/25/2022    CL 95 (L) 10/25/2022    CO2 31 10/25/2022       Lab Results   Component Value Date/Time    MG 2.10 10/25/2022 05:15 AM       Lab Results   Component Value Date    ALT 63 (H) 10/25/2022    AST 44 (H) 10/25/2022    ALKPHOS 42 10/25/2022    BILITOT 0.7 10/25/2022        No flowsheet data found. No results found for: LABA1C    Imaging:  XR CHEST PORTABLE   Final Result   Mild cardiomegaly which is more prominent with no acute pulmonary abnormality. CT ABDOMEN PELVIS W IV CONTRAST Additional Contrast? None   Final Result   1. Decreased size and conspicuity of previously described prominent   periportal lymph nodes. 2.  Mild hepatic steatosis. Consider correlation with LFTs.          US GALLBLADDER RUQ    (Results Pending)       Scheduled and prn Medications:    Scheduled Meds:   sodium chloride flush  5-40 mL IntraVENous 2 times per day    enoxaparin  40 mg SubCUTAneous Daily     Continuous Infusions:   sodium chloride       PRN Meds:.sodium chloride flush, sodium chloride, acetaminophen **OR** acetaminophen, ondansetron    Assessment & Plan:        Acute kidney injury  - cont to monitor BMP  - cont IVF for now with NPO status for n/v/abd pain      Abdominal pain/ n/v/d, possibly due to viral gastroenteritis  - diarrhea has stopped as has nausea and vomiting. Has some RUQ tenderness on exam.   - CT scan neg for acute findings.  - RUQ u/s fatty liver-- pt does admit to eating fried and greasy foods as a   - pain is more right flank with associated CVA tenderness  - cont to monitor   - GI consulted   - apparently also had an episode where he was in the bathroom and he admits to me that he was using an illicit substance that may have caused him to go unresponsive, he does not want to engage in this activity at this time and wants to be treated and agrees to follow rules in the hospital  - No antibiotics for now, monitor for any fevers any worsening signs of infection  - CT decreased size and conspicuity of previously described prominent periportal lymph nodes. Mild hepatic steatosis  - LFTs minimally elevated 63/44 ALT/AST; lipase normal    Altered mental status  - at baseline, oriented x4   - admits to taking percocet \"off the streets\" for abdominal pain  - urine tox screen positive for cocaine, fentanyl, cannabinoid, opiates      Hypoxia  - CXR neg for acute findings  - ? Aspiration after taking percocet and found down in bathroom here  - cont to monitor and wean O2  - incentive spirometer and acapella     Continue current regimen/therapies. Monitor. Adjust medical regimen as appropriate. Body mass index is 29.06 kg/m². The patient and / or the family were informed of the results of any tests, a time was given to answer questions, a plan was proposed and they agreed with plan. DVT ppx: lovenox      Diet: ADULT DIET;  Clear Liquid    Consults:  IP CONSULT TO HOSPITALIST    DISPO/placement plan: pending     Code Status: Full Code      LAURA Rain - CIRO  10/25/22

## 2022-10-25 NOTE — PLAN OF CARE
Problem: Discharge Planning  Goal: Discharge to home or other facility with appropriate resources  10/25/2022 1023 by Jeni Moe RN  Outcome: Progressing  10/25/2022 0615 by Mayi Watts RN  Outcome: Progressing  10/25/2022 0011 by Mayi Watts RN  Outcome: Progressing     Problem: Pain  Goal: Verbalizes/displays adequate comfort level or baseline comfort level  10/25/2022 1023 by Jeni Moe RN  Outcome: Progressing  10/25/2022 0615 by Mayi Watts RN  Outcome: Progressing  10/25/2022 0011 by Mayi Watts RN  Outcome: Progressing     Problem: Safety - Adult  Goal: Free from fall injury  10/25/2022 1023 by Jeni Moe RN  Outcome: Progressing  10/25/2022 0615 by Mayi Watts RN  Outcome: Progressing  10/25/2022 0011 by Mayi Watts RN  Outcome: Progressing

## 2022-10-25 NOTE — CONSULTS
GASTROENTEROLOGY INPATIENT CONSULTATION        IDENTIFYING DATA/REASON FOR CONSULTATION   PATIENT:  Damaris Colón  MRN:  [id number]  ADMIT DATE: 10/24/2022  TIME OF EVALUATION: 10/25/2022 1:38 PM  HOSPITAL STAY:   LOS: 1 day     REASON FOR CONSULTATION:  abdominal pain, nausea, vomiting, diarrhea    HISTORY OF PRESENT ILLNESS   Damaris Colón is a 44 y.o. male with no pertinent PMH presented on 10/24/2022 with abdominal pain, nausea and vomiting. We have been consulted regarding the same. Patient reports 2 weeks ago he developed diarrhea which lasted for approximately 1 week. He took antidiarrheal medications and symptoms subsided. He then developed acute onset of nausea and vomiting for the following week. He has also been having right-sided abdominal pain. He denies any sick contacts or recent travel. He is a  and believes he consumed undercooked chicken approximately 3 weeks ago. He does report dark bowel movements but denies melena. He reports coffee-ground emesis. His last bowel movement was on Sunday, which was formed. His last episode of emesis was yesterday. He denies any NSAID use. Apparently patient came to the ED for further evaluation and treatment, but was noted that he went to the bathroom while receiving work-up in the ED and then apparently nursing had apparently found him unresponsive inside the bathroom. He was immediately rushed to the bed and given Narcan and so this resolved his unresponsive nature. Urine drug screen is positive for cannabis, opiates, cocaine and fentanyl. Hemoglobin 12.7. LFTs minimally elevated with ALT 63 and AST 44. Right upper quadrant ultrasound with hepatic steatosis, otherwise normal.  CT A/P with IV contrast shows decreased size and conspicuity of previously described prominent periportal lymph nodes. Mild hepatic steatosis. Recent blood work indicates positive Hep C antibody.       PAST MEDICAL, SURGICAL, FAMILY, and SOCIAL HISTORY   No past medical history on file. Past Surgical History:   Procedure Laterality Date    ANKLE SURGERY       Family History   Problem Relation Age of Onset    Cancer Other     Diabetes Other     High Blood Pressure Other     Stroke Other      Social History     Socioeconomic History    Marital status: Single    Number of children: 1   Occupational History    Occupation: Unemployed   Tobacco Use    Smoking status: Former    Smokeless tobacco: Never    Tobacco comments:     10/16/22 vapes   Vaping Use    Vaping Use: Every day    Substances: Nicotine, Flavoring    Devices: Pre-filled or refillable cartridge   Substance and Sexual Activity    Alcohol use: Yes     Comment: 10/16/22 beer once every month    Drug use: No       MEDICATIONS   SCHEDULED:  methocarbamol, 750 mg, TID  sodium chloride flush, 5-40 mL, 2 times per day  enoxaparin, 40 mg, Daily      FLUIDS/DRIPS:     sodium chloride 100 mL/hr at 10/25/22 1223    sodium chloride       PRNs: oxyCODONE, 5 mg, Q6H PRN  sodium chloride flush, 5-40 mL, PRN  sodium chloride, , PRN  acetaminophen, 650 mg, Q6H PRN   Or  acetaminophen, 650 mg, Q6H PRN  ondansetron, 4 mg, Q6H PRN      ALLERGIES:  He   Allergies   Allergen Reactions    Depakote [Divalproex Sodium] Hives and Swelling    Pcn [Penicillins] Swelling       REVIEW OF SYSTEMS   Pertinent ROS noted in HPI    PHYSICAL EXAM     Vitals:    10/25/22 0515 10/25/22 0921 10/25/22 1227 10/25/22 1237   BP:  130/84  131/82   Pulse:  66  61   Resp:  18 17 18   Temp:  98.1 °F (36.7 °C)  98.7 °F (37.1 °C)   TempSrc:  Oral  Oral   SpO2:  97%  97%   Weight: 191 lb 2.2 oz (86.7 kg)      Height:           No intake/output data recorded.       Physical Exam:  General appearance: alert, cooperative, no distress, appears stated age  Eyes: Anicteric  Head: Normocephalic, without obvious abnormality  Lungs: clear to auscultation bilaterally, Normal Effort  Heart: regular rate and rhythm, normal S1 and S2, no murmurs or rubs  Abdomen: soft, non-distended, non-tender. Bowel sounds normal. No masses,  no organomegaly. Extremities: atraumatic, no cyanosis or edema  Skin: warm and dry, no jaundice  Neuro: Grossly intact, A&OX3      LABS AND IMAGING   Laboratory   Recent Labs     10/24/22  1424 10/25/22  0515   WBC 11.7* 13.4*   HGB 14.8 12.7*   HCT 45.0 37.5*   MCV 87.4 85.8    209     Recent Labs     10/24/22  1424 10/25/22  0515    135*   K 3.9 3.6   CL 88* 95*   CO2 33* 31   BUN 22* 16   CREATININE 2.1* 1.4*     Recent Labs     10/24/22  1424 10/25/22  0515   AST 64* 44*   ALT 86* 63*   BILITOT 0.8 0.7   ALKPHOS 59 42     Recent Labs     10/24/22  1424   LIPASE 14.0     No results for input(s): PROTIME, INR in the last 72 hours. Imaging  XR CHEST (2 VW)   Final Result   No acute cardiopulmonary findings         US GALLBLADDER RUQ   Final Result   Hepatic steatosis. Otherwise, unremarkable right upper quadrant ultrasound. XR CHEST PORTABLE   Final Result   Mild cardiomegaly which is more prominent with no acute pulmonary abnormality. CT ABDOMEN PELVIS W IV CONTRAST Additional Contrast? None   Final Result   1. Decreased size and conspicuity of previously described prominent   periportal lymph nodes. 2.  Mild hepatic steatosis. Consider correlation with LFTs. ASSESSMENT AND RECOMMENDATIONS   Jaren Perea is a 44 y.o. male with no pertinent PMH presented on 10/24/2022 with abdominal pain, nausea and vomiting. We have been consulted regarding the same. Urine drug screen is positive for cannabis, opiates, cocaine and fentanyl. Hemoglobin 12.7. LFTs minimally elevated with ALT 63 and AST 44. Right upper quadrant ultrasound with hepatic steatosis, otherwise normal.  CT A/P with IV contrast shows decreased size and conspicuity of previously described prominent periportal lymph nodes. Mild hepatic steatosis.   Recent blood work indicates positive Hep C antibody. IMPRESSION:    Nausea, vomiting diarrhea - suspect viral gastroenteritis. Symptomatically improving. Continue with supportive care. Coffee ground emesis: plan for EGD tomorrow to rule out PUD. Will start PPI BID. Positive Hepatitis C Antibody  -Reports knowledge of Hep C for the last two years. Denies IVDU or high risk sexual partners. He has had multiple tattoos done in unprofessional setting. Would benefit from outpatient treatment. Elevated LFTs: likely due to #3 and hepatitis steatosis. Will send full serological work-up to further evaluate. If you have any questions or need any further information, please feel free to contact our consult team.  Thank you for allowing us to participate in the care of Minor Tucker. The note was completed using Dragon voice recognition transcription. Every effort was made to ensure accuracy; however, inadvertent transcription errors may be present despite my best efforts to edit errors.       Enid Leonard PA-C

## 2022-10-26 ENCOUNTER — ANESTHESIA EVENT (OUTPATIENT)
Dept: ENDOSCOPY | Age: 37
DRG: 391 | End: 2022-10-26
Payer: MEDICAID

## 2022-10-26 ENCOUNTER — ANESTHESIA (OUTPATIENT)
Dept: ENDOSCOPY | Age: 37
DRG: 391 | End: 2022-10-26
Payer: MEDICAID

## 2022-10-26 LAB
A/G RATIO: 1.4 (ref 1.1–2.2)
ALBUMIN SERPL-MCNC: 3.9 G/DL (ref 3.4–5)
ALP BLD-CCNC: 44 U/L (ref 40–129)
ALT SERPL-CCNC: 54 U/L (ref 10–40)
ANION GAP SERPL CALCULATED.3IONS-SCNC: 9 MMOL/L (ref 3–16)
ANTI-NUCLEAR ANTIBODY (ANA): NEGATIVE
AST SERPL-CCNC: 35 U/L (ref 15–37)
BASOPHILS ABSOLUTE: 0.1 K/UL (ref 0–0.2)
BASOPHILS RELATIVE PERCENT: 0.7 %
BILIRUB SERPL-MCNC: 0.8 MG/DL (ref 0–1)
BUN BLDV-MCNC: 8 MG/DL (ref 7–20)
CALCIUM SERPL-MCNC: 8.5 MG/DL (ref 8.3–10.6)
CHLORIDE BLD-SCNC: 103 MMOL/L (ref 99–110)
CO2: 29 MMOL/L (ref 21–32)
CREAT SERPL-MCNC: 1.2 MG/DL (ref 0.9–1.3)
EOSINOPHILS ABSOLUTE: 0.1 K/UL (ref 0–0.6)
EOSINOPHILS RELATIVE PERCENT: 1.1 %
GFR SERPL CREATININE-BSD FRML MDRD: >60 ML/MIN/{1.73_M2}
GLUCOSE BLD-MCNC: 102 MG/DL (ref 70–99)
HCT VFR BLD CALC: 40.2 % (ref 40.5–52.5)
HEMOGLOBIN: 13.1 G/DL (ref 13.5–17.5)
LYMPHOCYTES ABSOLUTE: 2.8 K/UL (ref 1–5.1)
LYMPHOCYTES RELATIVE PERCENT: 29.3 %
MAGNESIUM: 2 MG/DL (ref 1.8–2.4)
MCH RBC QN AUTO: 29 PG (ref 26–34)
MCHC RBC AUTO-ENTMCNC: 32.6 G/DL (ref 31–36)
MCV RBC AUTO: 89 FL (ref 80–100)
MONOCYTES ABSOLUTE: 0.7 K/UL (ref 0–1.3)
MONOCYTES RELATIVE PERCENT: 7.7 %
NEUTROPHILS ABSOLUTE: 5.8 K/UL (ref 1.7–7.7)
NEUTROPHILS RELATIVE PERCENT: 61.2 %
PDW BLD-RTO: 14.5 % (ref 12.4–15.4)
PLATELET # BLD: 197 K/UL (ref 135–450)
PMV BLD AUTO: 10.5 FL (ref 5–10.5)
POTASSIUM SERPL-SCNC: 4.3 MMOL/L (ref 3.5–5.1)
RBC # BLD: 4.51 M/UL (ref 4.2–5.9)
SODIUM BLD-SCNC: 141 MMOL/L (ref 136–145)
TOTAL PROTEIN: 6.6 G/DL (ref 6.4–8.2)
WBC # BLD: 9.4 K/UL (ref 4–11)

## 2022-10-26 PROCEDURE — 94669 MECHANICAL CHEST WALL OSCILL: CPT

## 2022-10-26 PROCEDURE — 6360000002 HC RX W HCPCS: Performed by: INTERNAL MEDICINE

## 2022-10-26 PROCEDURE — 7100000000 HC PACU RECOVERY - FIRST 15 MIN: Performed by: INTERNAL MEDICINE

## 2022-10-26 PROCEDURE — 85025 COMPLETE CBC W/AUTO DIFF WBC: CPT

## 2022-10-26 PROCEDURE — 6370000000 HC RX 637 (ALT 250 FOR IP): Performed by: NURSE PRACTITIONER

## 2022-10-26 PROCEDURE — 94760 N-INVAS EAR/PLS OXIMETRY 1: CPT

## 2022-10-26 PROCEDURE — 88305 TISSUE EXAM BY PATHOLOGIST: CPT

## 2022-10-26 PROCEDURE — 88342 IMHCHEM/IMCYTCHM 1ST ANTB: CPT

## 2022-10-26 PROCEDURE — 2709999900 HC NON-CHARGEABLE SUPPLY: Performed by: INTERNAL MEDICINE

## 2022-10-26 PROCEDURE — 2580000003 HC RX 258: Performed by: ANESTHESIOLOGY

## 2022-10-26 PROCEDURE — 2580000003 HC RX 258: Performed by: INTERNAL MEDICINE

## 2022-10-26 PROCEDURE — C9113 INJ PANTOPRAZOLE SODIUM, VIA: HCPCS | Performed by: INTERNAL MEDICINE

## 2022-10-26 PROCEDURE — 6360000002 HC RX W HCPCS: Performed by: STUDENT IN AN ORGANIZED HEALTH CARE EDUCATION/TRAINING PROGRAM

## 2022-10-26 PROCEDURE — 2500000003 HC RX 250 WO HCPCS

## 2022-10-26 PROCEDURE — 0DB78ZX EXCISION OF STOMACH, PYLORUS, VIA NATURAL OR ARTIFICIAL OPENING ENDOSCOPIC, DIAGNOSTIC: ICD-10-PCS | Performed by: INTERNAL MEDICINE

## 2022-10-26 PROCEDURE — 36415 COLL VENOUS BLD VENIPUNCTURE: CPT

## 2022-10-26 PROCEDURE — 7100000001 HC PACU RECOVERY - ADDTL 15 MIN: Performed by: INTERNAL MEDICINE

## 2022-10-26 PROCEDURE — 80053 COMPREHEN METABOLIC PANEL: CPT

## 2022-10-26 PROCEDURE — 1200000000 HC SEMI PRIVATE

## 2022-10-26 PROCEDURE — 6370000000 HC RX 637 (ALT 250 FOR IP): Performed by: INTERNAL MEDICINE

## 2022-10-26 PROCEDURE — 83735 ASSAY OF MAGNESIUM: CPT

## 2022-10-26 PROCEDURE — 3700000001 HC ADD 15 MINUTES (ANESTHESIA): Performed by: INTERNAL MEDICINE

## 2022-10-26 PROCEDURE — 3609012400 HC EGD TRANSORAL BIOPSY SINGLE/MULTIPLE: Performed by: INTERNAL MEDICINE

## 2022-10-26 PROCEDURE — 3700000000 HC ANESTHESIA ATTENDED CARE: Performed by: INTERNAL MEDICINE

## 2022-10-26 PROCEDURE — 2580000003 HC RX 258: Performed by: STUDENT IN AN ORGANIZED HEALTH CARE EDUCATION/TRAINING PROGRAM

## 2022-10-26 PROCEDURE — 6360000002 HC RX W HCPCS

## 2022-10-26 RX ORDER — PROPOFOL 10 MG/ML
INJECTION, EMULSION INTRAVENOUS CONTINUOUS PRN
Status: DISCONTINUED | OUTPATIENT
Start: 2022-10-26 | End: 2022-10-26 | Stop reason: SDUPTHER

## 2022-10-26 RX ORDER — ONDANSETRON 2 MG/ML
4 INJECTION INTRAMUSCULAR; INTRAVENOUS
Status: DISCONTINUED | OUTPATIENT
Start: 2022-10-26 | End: 2022-10-27 | Stop reason: HOSPADM

## 2022-10-26 RX ORDER — LIDOCAINE HYDROCHLORIDE 20 MG/ML
INJECTION, SOLUTION EPIDURAL; INFILTRATION; INTRACAUDAL; PERINEURAL PRN
Status: DISCONTINUED | OUTPATIENT
Start: 2022-10-26 | End: 2022-10-26 | Stop reason: SDUPTHER

## 2022-10-26 RX ORDER — DIPHENHYDRAMINE HYDROCHLORIDE 50 MG/ML
12.5 INJECTION INTRAMUSCULAR; INTRAVENOUS
Status: DISCONTINUED | OUTPATIENT
Start: 2022-10-26 | End: 2022-10-27 | Stop reason: HOSPADM

## 2022-10-26 RX ORDER — SODIUM CHLORIDE 9 MG/ML
INJECTION, SOLUTION INTRAVENOUS PRN
Status: DISCONTINUED | OUTPATIENT
Start: 2022-10-26 | End: 2022-10-27 | Stop reason: HOSPADM

## 2022-10-26 RX ORDER — SODIUM CHLORIDE 0.9 % (FLUSH) 0.9 %
5-40 SYRINGE (ML) INJECTION EVERY 12 HOURS SCHEDULED
Status: DISCONTINUED | OUTPATIENT
Start: 2022-10-26 | End: 2022-10-27 | Stop reason: HOSPADM

## 2022-10-26 RX ORDER — SODIUM CHLORIDE 0.9 % (FLUSH) 0.9 %
5-40 SYRINGE (ML) INJECTION PRN
Status: DISCONTINUED | OUTPATIENT
Start: 2022-10-26 | End: 2022-10-27 | Stop reason: HOSPADM

## 2022-10-26 RX ORDER — PROPOFOL 10 MG/ML
INJECTION, EMULSION INTRAVENOUS PRN
Status: DISCONTINUED | OUTPATIENT
Start: 2022-10-26 | End: 2022-10-26 | Stop reason: SDUPTHER

## 2022-10-26 RX ADMIN — LIDOCAINE HYDROCHLORIDE 100 MG: 20 INJECTION, SOLUTION EPIDURAL; INFILTRATION; INTRACAUDAL; PERINEURAL at 13:58

## 2022-10-26 RX ADMIN — OXYCODONE 5 MG: 5 TABLET ORAL at 11:42

## 2022-10-26 RX ADMIN — OXYCODONE 5 MG: 5 TABLET ORAL at 00:53

## 2022-10-26 RX ADMIN — SODIUM CHLORIDE: 9 INJECTION, SOLUTION INTRAVENOUS at 13:58

## 2022-10-26 RX ADMIN — METHOCARBAMOL TABLETS 750 MG: 750 TABLET, COATED ORAL at 15:29

## 2022-10-26 RX ADMIN — PROPOFOL 100 MG: 10 INJECTION, EMULSION INTRAVENOUS at 13:58

## 2022-10-26 RX ADMIN — ONDANSETRON 4 MG: 2 INJECTION INTRAMUSCULAR; INTRAVENOUS at 17:36

## 2022-10-26 RX ADMIN — SODIUM CHLORIDE, PRESERVATIVE FREE 10 ML: 5 INJECTION INTRAVENOUS at 20:39

## 2022-10-26 RX ADMIN — METHOCARBAMOL TABLETS 750 MG: 750 TABLET, COATED ORAL at 09:13

## 2022-10-26 RX ADMIN — PROPOFOL 200 MCG/KG/MIN: 10 INJECTION, EMULSION INTRAVENOUS at 13:58

## 2022-10-26 RX ADMIN — Medication 10 ML: at 20:06

## 2022-10-26 RX ADMIN — OXYCODONE 5 MG: 5 TABLET ORAL at 18:25

## 2022-10-26 RX ADMIN — PANTOPRAZOLE SODIUM 40 MG: 40 INJECTION, POWDER, FOR SOLUTION INTRAVENOUS at 09:14

## 2022-10-26 RX ADMIN — PANTOPRAZOLE SODIUM 40 MG: 40 INJECTION, POWDER, FOR SOLUTION INTRAVENOUS at 20:05

## 2022-10-26 RX ADMIN — METHOCARBAMOL TABLETS 750 MG: 750 TABLET, COATED ORAL at 20:04

## 2022-10-26 RX ADMIN — Medication 10 ML: at 09:17

## 2022-10-26 RX ADMIN — ONDANSETRON 4 MG: 2 INJECTION INTRAMUSCULAR; INTRAVENOUS at 04:52

## 2022-10-26 ASSESSMENT — PAIN DESCRIPTION - FREQUENCY: FREQUENCY: CONTINUOUS

## 2022-10-26 ASSESSMENT — PAIN DESCRIPTION - ONSET
ONSET: ON-GOING
ONSET: ON-GOING

## 2022-10-26 ASSESSMENT — PAIN - FUNCTIONAL ASSESSMENT
PAIN_FUNCTIONAL_ASSESSMENT: ACTIVITIES ARE NOT PREVENTED
PAIN_FUNCTIONAL_ASSESSMENT: 0-10
PAIN_FUNCTIONAL_ASSESSMENT: PREVENTS OR INTERFERES SOME ACTIVE ACTIVITIES AND ADLS

## 2022-10-26 ASSESSMENT — PAIN DESCRIPTION - LOCATION
LOCATION: ABDOMEN

## 2022-10-26 ASSESSMENT — PAIN DESCRIPTION - ORIENTATION
ORIENTATION: RIGHT;MID
ORIENTATION: RIGHT;MID
ORIENTATION: RIGHT
ORIENTATION: RIGHT
ORIENTATION: RIGHT;LOWER

## 2022-10-26 ASSESSMENT — PAIN DESCRIPTION - DESCRIPTORS
DESCRIPTORS: ACHING
DESCRIPTORS: BURNING
DESCRIPTORS: ACHING
DESCRIPTORS: BURNING
DESCRIPTORS: ACHING
DESCRIPTORS: DISCOMFORT;STABBING

## 2022-10-26 ASSESSMENT — ENCOUNTER SYMPTOMS: SHORTNESS OF BREATH: 0

## 2022-10-26 ASSESSMENT — PAIN SCALES - GENERAL
PAINLEVEL_OUTOF10: 7
PAINLEVEL_OUTOF10: 7
PAINLEVEL_OUTOF10: 0
PAINLEVEL_OUTOF10: 9
PAINLEVEL_OUTOF10: 0
PAINLEVEL_OUTOF10: 6
PAINLEVEL_OUTOF10: 7

## 2022-10-26 ASSESSMENT — LIFESTYLE VARIABLES: SMOKING_STATUS: 0

## 2022-10-26 ASSESSMENT — PAIN DESCRIPTION - PAIN TYPE
TYPE: ACUTE PAIN
TYPE: ACUTE PAIN

## 2022-10-26 NOTE — H&P
Pre-operative History and Physical    Patient: Myrna Garcia  : 1985  Acct#:     Intended Procedure:  EGD    HISTORY OF PRESENT ILLNESS:  The patient is a 44 y.o. male  who presents for EGD due to nausea and vomiting. Past Medical History:    History reviewed. No pertinent past medical history. Past Surgical History:        Procedure Laterality Date    ANKLE SURGERY       Medications Prior to Admission:   No current facility-administered medications on file prior to encounter. Current Outpatient Medications on File Prior to Encounter   Medication Sig Dispense Refill    dicyclomine (BENTYL) 10 MG capsule Take 1 capsule by mouth every 6 hours as needed (cramps) (Patient not taking: Reported on 10/24/2022) 20 capsule 0    famotidine (PEPCID) 20 MG tablet Take 1 tablet by mouth 2 times daily (Patient not taking: Reported on 10/24/2022) 60 tablet 0    ondansetron (ZOFRAN ODT) 4 MG disintegrating tablet Take 1 tablet by mouth every 8 hours as needed for Nausea (Patient not taking: Reported on 10/24/2022) 20 tablet 0        Allergies:  Depakote [divalproex sodium] and Pcn [penicillins]    Social History:   TOBACCO:   reports that he has quit smoking. He has never used smokeless tobacco.  ETOH:   reports current alcohol use. DRUGS:   reports no history of drug use. PHYSICAL EXAM:      Vital Signs: BP (!) 145/86   Pulse 60   Temp 97.7 °F (36.5 °C) (Temporal)   Resp 16   Ht 5' 8\" (1.727 m)   Wt 188 lb 11.4 oz (85.6 kg)   SpO2 98%   BMI 28.69 kg/m²    Airway: No stridor or wheezing noted. Good air movement  Pulmonary: without wheezes.   Clear to auscultation  Cardiac:regular rate and rhythm without loud murmurs  Abdomen:soft, nontender,  Bowel sounds present    Pre-Procedure Assessment / Plan:  1) EGD    ASA Grade:  ASA 2 - Patient with mild systemic disease with no functional limitations  Mallampati Classification:  Class II    Level of Sedation Plan: MAC    Post Procedure plan: Return to same level of care    I assessed the patient and find that the patient is in satisfactory condition to proceed with the planned procedure and sedation plan. I have explained the risk, benefits, and alternatives to the procedure; the patient understands and agrees to proceed.        Earnestine Bhatia MD  10/26/2022

## 2022-10-26 NOTE — CARE COORDINATION
INITIAL CASE MANAGEMENT ASSESSMENT    Reviewed chart, met with patient to assess possible discharge needs. Explained Case Management role/services. Living Situation: Patient lives with his Girlfriend in an apartment with 4 steps to enter. ADLs: Independent     DME: None    PT/OT Recs: None     Active Services: None     Transportation: Active /Girlfriend can transport     Medications: No Pharmacy    PCP: No PCP/Has [place] Medicaid      HD/PD: N/A    PLAN/COMMENTS: Patient states that he drinks very seldomly and does not do drugs and is not interested in any type of Rehab. SW/CM provided contact information for patient or family to call with any questions. SW/CM will follow and assist as needed.    Electronically signed by Alvarez Capellan RN on 10/26/2022 at 3:31 PM

## 2022-10-26 NOTE — PROGRESS NOTES
Pt resting comfortably at this time, states mild pain to RLQ, denies nausea. States feels ready to go upstairs, report called to Elizabeth Bruno on the floor. VSS. No signs of distress noted at this time.

## 2022-10-26 NOTE — PROGRESS NOTES
Physician Progress Note      PATIENT:               Michael Webster  CSN #:                  [id number]  :                       1985  ADMIT DATE:       10/24/2022 2:35 PM  [address] [place] DATE:  RESPONDING  PROVIDER #:        Manning Harada APRN - CNP          QUERY TEXT:    Patient admitted with STEVEN. Documentation reflects drug overdose  in ED note . If possible, please document in the progress notes and discharge summary if   drug overdose  was: The medical record reflects the following:  Risk Factors: drug use, + to screen ,hep c  Clinical Indicators: Documentation per ED of Accidental drug overdose. After I   saw him I placed orders to start an IV due to an STEVEN and needing a scan. After that point apparently he went to the bathroom and came out and within 2   minutes per the nursing staff he became unresponsive. He did not respond to   my sternal rub. He had pinpoint pupils. His pulse ox was 52% on room air. He was gray with oral cyanosis and his nailbeds were blue. I ordered for the   nurse to give him Narcan and within 60 seconds he responded to intranasal   Narcan. He denied taking anything. Urine dr  Treatment: Narcan monitor    Thank you, Hai Juan RN CDS CRCR CCDS  [email or web address]  Options provided:  -- Accidental drug overdose  confirmed after study  -- Accidental drug overdose  treated and resolved  -- Accidental drug overdose  ruled out after study  -- Other - I will add my own diagnosis  -- Disagree - Not applicable / Not valid  -- Disagree - Clinically unable to determine / Unknown  -- Refer to Clinical Documentation Reviewer    PROVIDER RESPONSE TEXT:    Accidental drug overdose confirmed after study. Query created by: Cathy Juan on 10/25/2022 9:39 AM      QUERY TEXT:    Pt admitted with STEVEN. Pt noted to have altered mental status, unresponsive   episode .  If possible, please document in the progress notes and discharge   summary if you are evaluating and / or treating any of the following: The medical record reflects the following:  Risk Factors: + tox screen  Clinical Indicators: Tox screen +  cocaine, fentanyl, opiates and   cannabinoids. Per ED-At the start of my shift I was asked to see this patient   which I did. He did not have an IV in at that time. After I saw him I placed   orders to start an IV due to an STEVEN and needing a scan. After that point   apparently he went to the bathroom and came out and within 2 minutes per the   nursing staff he became unresponsive. He did not respond to my sternal rub. He had pinpoint pupils. His pulse ox was 52% on room air. He was gray with   oral cyanosis and his nailbeds were blue. I ordered  Treatment: narcan, o2, monitor mental status    Thank you, Hai Juan RN CDS CRCR TRISTIAN [email or web address]  Options provided:  -- Drug-induced encephalopathy due to  cocaine, fentanyl, opiates and   cannabinoids  -- Drug-induced encephalopathy due to, Please specify substance. 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 Query created by:  Cathy Juan on 10/25/2022 9:46 AM      Electronically signed by:  Manning Harada APRN - CNP 10/26/2022 5:11 PM

## 2022-10-26 NOTE — ANESTHESIA PRE PROCEDURE
Department of Anesthesiology  Preprocedure Note       Name:  Brady Boas   Age:  44 y.o.  :  1985                                          MRN:  [id number]         Date:  10/26/2022      Surgeon: Sharla Robles):  Jasmin Bush MD    Procedure: Procedure(s):  ESOPHAGOGASTRODUODENOSCOPY    Medications prior to admission:   Prior to Admission medications    Medication Sig Start Date End Date Taking?  Authorizing Provider   dicyclomine (BENTYL) 10 MG capsule Take 1 capsule by mouth every 6 hours as needed (cramps)  Patient not taking: Reported on 10/24/2022 10/16/22   Bernadette Robin MD   famotidine (PEPCID) 20 MG tablet Take 1 tablet by mouth 2 times daily  Patient not taking: Reported on 10/24/2022 10/16/22   Bernadette Robin MD   ondansetron (ZOFRAN ODT) 4 MG disintegrating tablet Take 1 tablet by mouth every 8 hours as needed for Nausea  Patient not taking: Reported on 10/24/2022 10/16/22   Bernadette Robin MD       Current medications:    Current Facility-Administered Medications   Medication Dose Route Frequency Provider Last Rate Last Admin    oxyCODONE (ROXICODONE) immediate release tablet 5 mg  5 mg Oral Q6H PRN Sherrel Rinne, APRN - CNP   5 mg at 10/26/22 1142    methocarbamol (ROBAXIN) tablet 750 mg  750 mg Oral TID Sherrel Rinne, APRN - CNP   750 mg at 10/26/22 0913    pantoprazole (PROTONIX) injection 40 mg  40 mg IntraVENous BID Jasmin Bush MD   40 mg at 10/26/22 0914    nicotine (NICODERM CQ) 21 MG/24HR 1 patch  1 patch TransDERmal Daily Sherrel Rinne, APRN - CNP   1 patch at 10/26/22 0914    calcium carbonate (TUMS) chewable tablet 500 mg  500 mg Oral TID PRN LAURA Schilling CNP   500 mg at 10/25/22 2016    sodium chloride flush 0.9 % injection 5-40 mL  5-40 mL IntraVENous 2 times per day Alek LOUISE Estes, DO   10 mL at 10/26/22 0917    sodium chloride flush 0.9 % injection 5-40 mL  5-40 mL IntraVENous PRN Alekrebekah Estes, DO        0.9 % sodium chloride infusion   IntraVENous PRN Alek M Meera, DO        enoxaparin (LOVENOX) injection 40 mg  40 mg SubCUTAneous Daily Alek LOUISE Meera, DO   40 mg at 10/25/22 0930    acetaminophen (TYLENOL) tablet 650 mg  650 mg Oral Q6H PRN Alek M Meera, DO        Or    acetaminophen (TYLENOL) suppository 650 mg  650 mg Rectal Q6H PRN Alek Denice Pressman, DO        ondansetron (ZOFRAN) injection 4 mg  4 mg IntraVENous Q6H PRN Alek LOUISE Estes, DO   4 mg at 10/26/22 0452       Allergies: Allergies   Allergen Reactions    Depakote [Divalproex Sodium] Hives and Swelling    Pcn [Penicillins] Swelling       Problem List:    Patient Active Problem List   Diagnosis Code    Lumbar sprain S33. 5XXA    Lumbar contusion S30. 0XXA    Electric injury [id number]. 8XXA    Acute radial nerve palsy G56.30    Ulnar nerve palsy G56.20    Hand numbness R20.0    Hand weakness R29.898    AMS (altered mental status) R41.82    STEVEN (acute kidney injury) ([facility] [address] N17.9    Abdominal pain R10.9    Hypoxia R09.02       Past Medical History:  History reviewed. No pertinent past medical history.     Past Surgical History:        Procedure Laterality Date    ANKLE SURGERY         Social History:    Social History     Tobacco Use    Smoking status: Former    Smokeless tobacco: Never    Tobacco comments:     10/16/22 chetna   Substance Use Topics    Alcohol use: Yes     Comment: 10/16/22 beer once every month                                Counseling given: Not Answered  Tobacco comments: 10/16/22 chetna      Vital Signs (Current):   Vitals:    10/26/22 0752 10/26/22 0905 10/26/22 1047 10/26/22 1250   BP:  130/83 120/76 (!) 145/86   Pulse:  57 59 60   Resp:    16   Temp:  97.9 °F (36.6 °C) 98.1 °F (36.7 °C) 97.7 °F (36.5 °C)   TempSrc:  Oral Oral Temporal   SpO2: 95% 96% 93% 98%   Weight:       Height:                                                  BP Readings from Last 3 Encounters:   10/26/22 (!) 145/86   10/16/22 118/75   08/10/22 137/88       NPO Status: Time of last liquid consumption: 0800                        Time of last solid consumption: 1800                        Date of last liquid consumption: 10/26/22                        Date of last solid food consumption: 10/25/22    BMI:   Wt Readings from Last 3 Encounters:   10/26/22 188 lb 11.4 oz (85.6 kg)   10/16/22 199 lb 4.7 oz (90.4 kg)   08/10/22 184 lb (83.5 kg)     Body mass index is 28.69 kg/m². CBC:   Lab Results   Component Value Date/Time    WBC 9.4 10/26/2022 05:47 AM    RBC 4.51 10/26/2022 05:47 AM    HGB 13.1 10/26/2022 05:47 AM    HCT 40.2 10/26/2022 05:47 AM    MCV 89.0 10/26/2022 05:47 AM    RDW 14.5 10/26/2022 05:47 AM     10/26/2022 05:47 AM       CMP:   Lab Results   Component Value Date/Time     10/26/2022 05:47 AM    K 4.3 10/26/2022 05:47 AM    K 4.1 10/16/2022 07:50 PM     10/26/2022 05:47 AM    CO2 29 10/26/2022 05:47 AM    BUN 8 10/26/2022 05:47 AM    CREATININE 1.2 10/26/2022 05:47 AM    GFRAA >60 10/16/2022 07:50 PM    GFRAA >60 05/11/2011 11:45 PM    AGRATIO 1.4 10/26/2022 05:47 AM    LABGLOM >60 10/26/2022 05:47 AM    GLUCOSE 102 10/26/2022 05:47 AM    PROT 6.6 10/26/2022 05:47 AM    CALCIUM 8.5 10/26/2022 05:47 AM    BILITOT 0.8 10/26/2022 05:47 AM    ALKPHOS 44 10/26/2022 05:47 AM    AST 35 10/26/2022 05:47 AM    ALT 54 10/26/2022 05:47 AM       POC Tests: No results for input(s): POCGLU, POCNA, POCK, POCCL, POCBUN, POCHEMO, POCHCT in the last 72 hours.     Coags:   Lab Results   Component Value Date/Time    PROTIME 14.0 10/25/2022 04:09 PM    INR 1.08 10/25/2022 04:09 PM    APTT 31.5 08/04/2013 05:52 PM       HCG (If Applicable): No results found for: PREGTESTUR, PREGSERUM, HCG, HCGQUANT     ABGs: No results found for: PHART, PO2ART, HVU5QTL, RDV7KCG, BEART, G7GZKFBL     Type & Screen (If Applicable):  No results found for: LABABO, LABRH    Drug/Infectious Status (If Applicable):  No results found for: HIV, HEPCAB    COVID-19 Screening (If Applicable): No results found for: COVID19        Anesthesia Evaluation  Patient summary reviewed no history of anesthetic complications:   Airway: Mallampati: II  TM distance: >3 FB   Neck ROM: full  Mouth opening: > = 3 FB   Dental: normal exam         Pulmonary:Negative Pulmonary ROS       (-) shortness of breath and not a current smoker          Patient did not smoke on day of surgery. Cardiovascular:Negative CV ROS        (-) pacemaker, past MI, CABG/stent and  angina       Beta Blocker:  Not on Beta Blocker         Neuro/Psych:   Negative Neuro/Psych ROS     (-) seizures and CVA           GI/Hepatic/Renal:        (-) liver disease and no renal disease      ROS comment: Coffee ground emesis. Endo/Other: Negative Endo/Other ROS       (-) diabetes mellitus, hypothyroidism, hyperthyroidism               Abdominal:             Vascular: negative vascular ROS. Other Findings:           Anesthesia Plan      MAC     ASA 2       Induction: intravenous. Anesthetic plan and risks discussed with patient. Plan discussed with CRNA. This pre-anesthesia assessment may be used as a history and physical.    DOS STAFF ADDENDUM:    Pt seen and examined, chart reviewed (including anesthesia, drug and allergy history). No interval changes to history and physical examination. Anesthetic plan, risks, benefits, alternatives, and personnel involved discussed with patient. Patient verbalized an understanding and agrees to proceed.       Ulices Alberto MD  October 26, 2022  12:55 PM

## 2022-10-26 NOTE — ANESTHESIA POSTPROCEDURE EVALUATION
Department of Anesthesiology  Postprocedure Note    Patient: Jayda Valdez  MRN: [id number]  YOB: 1985  Date of evaluation: 10/26/2022      Procedure Summary     Date: 10/26/22 Room / Location: [address] [place] [address]    Anesthesia Start: 1351 Anesthesia Stop: 0163    Procedure: EGD BIOPSY Diagnosis:       Coffee ground emesis      (COFFEE GROUND EMESIS)    Surgeons: Alexandra Galdamez MD Responsible Provider: Cathy Olmos MD    Anesthesia Type: MAC ASA Status: 2          Anesthesia Type: No value filed.     Ariane Phase I: Ariane Score: 10    Ariane Phase II:        Anesthesia Post Evaluation    Patient location during evaluation: bedside  Patient participation: complete - patient participated  Level of consciousness: awake and alert  Pain score: 0  Nausea & Vomiting: no nausea  Complications: no  Cardiovascular status: hemodynamically stable  Respiratory status: acceptable  Hydration status: stable

## 2022-10-26 NOTE — OP NOTE
Endoscopy Note    Patient: Jaren Perea  : 1985  Acct#:     Procedure: Esophagogastroduodenoscopy with biopsy                         Date:  10/26/2022     Surgeon:   Zoey Goode MD    Referring Physician:  . None (Inactive)    Indications: This is a 44 y.o. male  who presents for EGD due to nausea and vomiting. Postoperative Diagnosis:    1. LA Grade C esophagitis  2. Mild gastric erythema, without ulceration or erosion. Biopsies were obtained from the gastric antrum, incisura and body to evaluate for H. Pylori. Anesthesia:  MAC    Consent:  The patient or their legal guardian has signed an informed consent, and is aware of the potential risks, benefits, alternatives, and potential complications of this procedure. These include, but are not limited to hemorrhage, bleeding, post procedural pain, perforation, phlebitis, aspiration, hypotension, hypoxia, cardiovascular events such as arryhthmia, and possibly death. Description of Procedure: The patient was then taken to the endoscopy suite, placed in the left lateral decubitus position and the above IV sedation was administrered. The Olympus video endoscope was placed through the patient's oropharynx without difficulty to the extent of the 2nd portion of the duodenum. Both forward and retroflexed views of the stomach were obtained. Findings:    Esophagus: The esophagus was notable for LA Yeny C esophagitis. The Z line was located 39 cm from the incisors without obvious changes to suggest Agustin's esophagus. Stomach: The stomach was examined on forward and retroflexed views, and notable for mild gastric mucosal erythema. Biopsies were obtained from the gastric antrum, incisura and body to evaluate for H. Pylori.    Duodenum: The first and 2nd portions of the duodenum appeared normal with normal villous pattern    The scope was then withdrawn back into the stomach, it was decompressed, and the scope was completely withdrawn. The patient tolerated the procedure well and was taken to the post anesthesia care unit in good condition. Estimated Blood Loss: Minimal     Impression:   1) See post procedure diagnoses    Recommendations:   - Follow-up pathology results in 7 days, by calling the office at [phone].  - Resume regular medications. - Resume diet as tolerated. - Ok to discharge from GI standpoint when tolerating a diet.     Merline Spoon, R Carne [address] [facility]

## 2022-10-26 NOTE — PROGRESS NOTES
Patient alert and oriented x 4. Respirations easy and even. Patient updated of procedure time for 1300, with a  time of 1200. Patient encouraged to remain NPO and remove personal clothing and wear a hospital gown. Patient agreeable. Call light in reach. See am assessment.

## 2022-10-26 NOTE — PROGRESS NOTES
Hospital Medicine Progress Note      Admit Date: 10/24/2022       CC: F/U for n/v/d, persistent abd pain    HPI: The patient is a 44 y.o. male with no major past medical history who presents to [facility] with initial concern that 1 week prior he was having persistent intermittent occurrences of diarrhea but no other symptoms of note and then took an over-the-counter antidiarrheal agent and so it resolved but then over the last few days he has unfortunately had recurrent intermittent nausea and vomiting and epigastric abdominal pain. He has had some previous diagnosis of gallstones in the past but uncertain if this is entirely related to his current symptoms because he has not had any other recent GI symptoms up until now over the last week. Apparently patient came to the ED for further evaluation and treatment, but was noted that he went to the bathroom while receiving work-up in the ED and then apparently nursing had apparently found him unresponsive inside the bathroom. He was immediately rushed to the bed and given Narcan and so this resolved his unresponsive nature. He is currently still on nasal cannula oxygen, potentially from some aspiration or still from being somewhat somnolent but at this time he is very alert and oriented and able to give me further details. He is very apologetic for the incident. Apart from the above symptoms that are noted, he denies that his friends or family are afflicted with any similar symptoms. Over the course of the last week or so with the diarrhea and the nausea and vomiting, he denies any other recent symptoms of fever, chills, dysuria, dizziness, syncope, chest pain, shortness of breath, blood in urine/stool/sputum/vomitus, leg swelling, rashes        10/25: RUQ ultrasound today - fatty liver. States he does eat a lot of fatty and greasy foods because he is a .  He states his symtoms started with diarrhea and then progressed to abd pain, n/v about 3 days later. Last episode of emesis Monday afternoon at the hospital, no diarrhea for a couple weeks. Denies drinking alcohol daily. No recent antibiotics. No prior abd surgeries, CT neg for acute findings. STEVEN likely due to low po intake, dehydration. On 3L nc with no acute findings CT chest. Will repeat CXR for possible aspiration pneumonia after he took a pain pill and then passed out. States he has had kidney stones in the past years ago. He said he passed them all on his own. He said back then he was drinking a lot of sweet tea. His urine is neg for hematuria or leuks. CXR neg for acute findings. Interval History/Subjective: NPO for EGD today. Off O2. + hep C. Will need outpatient f/u for this. He said he took some treatment for it. Vomited yesterday after beef broth soup.     -----EGD showed Grade C esophagitis, no ulcers. Review of Systems:       The patient denied headaches, visual changes, LOC, SOB, CP, ABD pain, N/V/D, skin changes, new or worsening weakness or neuromuscular deficits. Comprehensive ROS negative except as mentioned above. Past Medical History:    No past medical history on file. Past Surgical History:        Procedure Laterality Date    ANKLE SURGERY         Allergies:  Depakote [divalproex sodium] and Pcn [penicillins]    Past medical and surgical history reviewed. Any changes have been noted. PHYSICAL EXAM:  /77   Pulse 55   Temp 98.3 °F (36.8 °C) (Oral)   Resp 18   Ht 5' 8\" (1.727 m)   Wt 188 lb 11.4 oz (85.6 kg)   SpO2 95%   BMI 28.69 kg/m²       Intake/Output Summary (Last 24 hours) at 10/26/2022 9593  Last data filed at 10/26/2022 0445  Gross per 24 hour   Intake --   Output 4200 ml   Net -4200 ml        General appearance:   No apparent distress, appears stated age. Cooperative. HEENT:  Normocephalic, atraumatic. PERRLA. EOMi. Conjunctivae/corneas clear, no icterus, non-injected. Neck: Supple, with full range of motion.  No jugular venous distention. Trachea midline. Respiratory:  Normal respiratory effort. Clear to auscultation, bilaterally without Rales/Wheezes/Rhonchi. Cardiovascular:  Regular rate and rhythm without murmurs, rubs or gallops. Abdomen: Soft, non-tender, non-distended, without rebound or guarding. Normal bowel sounds. Musculoskeletal:  right flank/ CVA tenderness; No clubbing, cyanosis or edema bilaterally. Full range of motion without deformity. Skin: Skin color, texture, turgor normal.  No rashes or lesions. Neurologic:  Neurovascularly intact without any focal sensory/motor deficits. Cranial nerves: II-XII intact, grossly intact. No facial asymmetry, tongue midline. Psychiatric:  Alert and oriented, thought content appropriate  Capillary Refill: Brisk,< 3 seconds   Peripheral Pulses: +2 palpable, equal bilaterally        LABS:    Lab Results   Component Value Date    WBC 9.4 10/26/2022    HGB 13.1 (L) 10/26/2022    HCT 40.2 (L) 10/26/2022    MCV 89.0 10/26/2022     10/26/2022    LYMPHOPCT 29.3 10/26/2022    RBC 4.51 10/26/2022    MCH 29.0 10/26/2022    MCHC 32.6 10/26/2022    RDW 14.5 10/26/2022       Lab Results   Component Value Date    CREATININE 1.2 10/26/2022    BUN 8 10/26/2022     10/26/2022    K 4.3 10/26/2022     10/26/2022    CO2 29 10/26/2022       Lab Results   Component Value Date/Time    MG 2.00 10/26/2022 05:47 AM       Lab Results   Component Value Date    ALT 54 (H) 10/26/2022    AST 35 10/26/2022    ALKPHOS 44 10/26/2022    BILITOT 0.8 10/26/2022        No flowsheet data found. No results found for: LABA1C    Imaging:  XR CHEST (2 VW)   Final Result   No acute cardiopulmonary findings         US GALLBLADDER RUQ   Final Result   Hepatic steatosis. Otherwise, unremarkable right upper quadrant ultrasound. XR CHEST PORTABLE   Final Result   Mild cardiomegaly which is more prominent with no acute pulmonary abnormality.          CT ABDOMEN PELVIS W IV CONTRAST Additional Contrast? None   Final Result   1. Decreased size and conspicuity of previously described prominent   periportal lymph nodes. 2.  Mild hepatic steatosis. Consider correlation with LFTs. Scheduled and prn Medications:    Scheduled Meds:   methocarbamol  750 mg Oral TID    pantoprazole  40 mg IntraVENous BID    nicotine  1 patch TransDERmal Daily    sodium chloride flush  5-40 mL IntraVENous 2 times per day    enoxaparin  40 mg SubCUTAneous Daily     Continuous Infusions:   sodium chloride       PRN Meds:.oxyCODONE, calcium carbonate, sodium chloride flush, sodium chloride, acetaminophen **OR** acetaminophen, ondansetron    Assessment & Plan:        Acute kidney injury  - cont to monitor BMP  - cont IVF for now with NPO status for n/v/abd pain        Abdominal pain/ n/v/d, possibly due to viral gastroenteritis  - diarrhea has stopped as has nausea and vomiting. Has some RUQ tenderness on exam.   - reported coffee ground emesis to GI team. Plan for EGD today  - PPI bid  - CT scan neg for acute findings.  - RUQ u/s fatty liver-- pt does admit to eating fried and greasy foods as a   - pain is more right flank with associated CVA tenderness  - cont to monitor   - GI consulted   - apparently also had an episode where he was in the bathroom and he admits to me that he was using an illicit substance that may have caused him to go unresponsive, he does not want to engage in this activity at this time and wants to be treated and agrees to follow rules in the hospital  - No antibiotics for now, monitor for any fevers any worsening signs of infection  - CT decreased size and conspicuity of previously described prominent periportal lymph nodes.  Mild hepatic steatosis  - LFTs minimally elevated 63/44 ALT/AST; lipase normal  - EGD- grade C esophagitis, no ulcers     Altered mental status  - at baseline, oriented x4   - admits to taking percocet \"off the streets\" for abdominal pain  - urine tox screen positive for cocaine, fentanyl, cannabinoid, opiates        Hypoxia- now resolved  - CXR neg for acute findings  - ? Aspiration after taking percocet and found down in bathroom here  - cont to monitor and wean O2  - incentive spirometer and acapella     Positive Hep C antibody   - patient is aware of this for the last 2 yrs. - denies IVDU or high risk sexual activity  - does have multiple tattoos that were not professionally placed  - outpatient f/u for this recommended  - elevated LFTs likely due to hep C and hepatic steatosis    Continue current regimen/therapies. Monitor. Adjust medical regimen as appropriate. Body mass index is 28.69 kg/m². The patient and / or the family were informed of the results of any tests, a time was given to answer questions, a plan was proposed and they agreed with plan.       DVT ppx: lovenox      Diet: Diet NPO    Consults:  IP CONSULT TO HOSPITALIST  IP CONSULT TO GI    DISPO/placement plan: pending     Code Status: Full Code      LAURA Cabral - CIRO  10/26/22

## 2022-10-26 NOTE — PROGRESS NOTES
Pt to pacu from endo. Pt awake but groggy, placed on monitor, VSS. Pt very talkative at arrival. VSS. Report obtained.

## 2022-10-26 NOTE — PROGRESS NOTES
Patient returned to room 4117 from PACU. Patient is alert, smiling. No signs or symptoms of distress or discomfort. VS stable. Menu given to patient. Call light in reach.

## 2022-10-27 VITALS
BODY MASS INDEX: 28.13 KG/M2 | HEART RATE: 55 BPM | DIASTOLIC BLOOD PRESSURE: 67 MMHG | RESPIRATION RATE: 18 BRPM | TEMPERATURE: 98.2 F | HEIGHT: 68 IN | OXYGEN SATURATION: 98 % | SYSTOLIC BLOOD PRESSURE: 120 MMHG | WEIGHT: 185.63 LBS

## 2022-10-27 LAB
A/G RATIO: 1.3 (ref 1.1–2.2)
AFP: 2.5 UG/L
ALBUMIN SERPL-MCNC: 3.9 G/DL (ref 3.4–5)
ALP BLD-CCNC: 48 U/L (ref 40–129)
ALPHA-1 ANTITRYPSIN: 154 MG/DL (ref 90–200)
ALT SERPL-CCNC: 51 U/L (ref 10–40)
ANION GAP SERPL CALCULATED.3IONS-SCNC: 10 MMOL/L (ref 3–16)
AST SERPL-CCNC: 31 U/L (ref 15–37)
BASOPHILS ABSOLUTE: 0.1 K/UL (ref 0–0.2)
BASOPHILS RELATIVE PERCENT: 1 %
BILIRUB SERPL-MCNC: 0.7 MG/DL (ref 0–1)
BUN BLDV-MCNC: 12 MG/DL (ref 7–20)
CALCIUM SERPL-MCNC: 8.8 MG/DL (ref 8.3–10.6)
CERULOPLASMIN: 29 MG/DL (ref 15–30)
CHLORIDE BLD-SCNC: 103 MMOL/L (ref 99–110)
CO2: 27 MMOL/L (ref 21–32)
CREAT SERPL-MCNC: 1.1 MG/DL (ref 0.9–1.3)
EOSINOPHILS ABSOLUTE: 0.1 K/UL (ref 0–0.6)
EOSINOPHILS RELATIVE PERCENT: 1.5 %
F-ACTIN AB IGG: 9 UNITS (ref 0–19)
GFR SERPL CREATININE-BSD FRML MDRD: >60 ML/MIN/{1.73_M2}
GLUCOSE BLD-MCNC: 121 MG/DL (ref 70–99)
HCT VFR BLD CALC: 41.1 % (ref 40.5–52.5)
HEMOGLOBIN: 13.7 G/DL (ref 13.5–17.5)
LYMPHOCYTES ABSOLUTE: 2.6 K/UL (ref 1–5.1)
LYMPHOCYTES RELATIVE PERCENT: 25.6 %
MAGNESIUM: 1.9 MG/DL (ref 1.8–2.4)
MCH RBC QN AUTO: 29.4 PG (ref 26–34)
MCHC RBC AUTO-ENTMCNC: 33.4 G/DL (ref 31–36)
MCV RBC AUTO: 88.1 FL (ref 80–100)
MONOCYTES ABSOLUTE: 0.8 K/UL (ref 0–1.3)
MONOCYTES RELATIVE PERCENT: 8 %
NEUTROPHILS ABSOLUTE: 6.4 K/UL (ref 1.7–7.7)
NEUTROPHILS RELATIVE PERCENT: 63.9 %
PDW BLD-RTO: 14.8 % (ref 12.4–15.4)
PLATELET # BLD: 207 K/UL (ref 135–450)
PMV BLD AUTO: 10.5 FL (ref 5–10.5)
POTASSIUM SERPL-SCNC: 4.4 MMOL/L (ref 3.5–5.1)
RBC # BLD: 4.67 M/UL (ref 4.2–5.9)
SODIUM BLD-SCNC: 140 MMOL/L (ref 136–145)
TOTAL PROTEIN: 6.8 G/DL (ref 6.4–8.2)
WBC # BLD: 10 K/UL (ref 4–11)

## 2022-10-27 PROCEDURE — 80053 COMPREHEN METABOLIC PANEL: CPT

## 2022-10-27 PROCEDURE — 83735 ASSAY OF MAGNESIUM: CPT

## 2022-10-27 PROCEDURE — 2580000003 HC RX 258: Performed by: INTERNAL MEDICINE

## 2022-10-27 PROCEDURE — 6360000002 HC RX W HCPCS: Performed by: INTERNAL MEDICINE

## 2022-10-27 PROCEDURE — 6370000000 HC RX 637 (ALT 250 FOR IP): Performed by: INTERNAL MEDICINE

## 2022-10-27 PROCEDURE — 85025 COMPLETE CBC W/AUTO DIFF WBC: CPT

## 2022-10-27 PROCEDURE — C9113 INJ PANTOPRAZOLE SODIUM, VIA: HCPCS | Performed by: INTERNAL MEDICINE

## 2022-10-27 PROCEDURE — 94669 MECHANICAL CHEST WALL OSCILL: CPT

## 2022-10-27 PROCEDURE — 94760 N-INVAS EAR/PLS OXIMETRY 1: CPT

## 2022-10-27 PROCEDURE — 36415 COLL VENOUS BLD VENIPUNCTURE: CPT

## 2022-10-27 RX ORDER — PANTOPRAZOLE SODIUM 20 MG/1
20 TABLET, DELAYED RELEASE ORAL 2 TIMES DAILY
Qty: 60 TABLET | Refills: 3 | Status: SHIPPED | OUTPATIENT
Start: 2022-10-27

## 2022-10-27 RX ADMIN — ACETAMINOPHEN 650 MG: 325 TABLET ORAL at 02:51

## 2022-10-27 RX ADMIN — OXYCODONE 5 MG: 5 TABLET ORAL at 00:35

## 2022-10-27 RX ADMIN — Medication 10 ML: at 08:16

## 2022-10-27 RX ADMIN — PANTOPRAZOLE SODIUM 40 MG: 40 INJECTION, POWDER, FOR SOLUTION INTRAVENOUS at 08:14

## 2022-10-27 RX ADMIN — ONDANSETRON 4 MG: 2 INJECTION INTRAMUSCULAR; INTRAVENOUS at 00:37

## 2022-10-27 RX ADMIN — METHOCARBAMOL TABLETS 750 MG: 750 TABLET, COATED ORAL at 08:15

## 2022-10-27 RX ADMIN — ENOXAPARIN SODIUM 40 MG: 100 INJECTION SUBCUTANEOUS at 08:14

## 2022-10-27 RX ADMIN — OXYCODONE 5 MG: 5 TABLET ORAL at 06:39

## 2022-10-27 ASSESSMENT — PAIN - FUNCTIONAL ASSESSMENT
PAIN_FUNCTIONAL_ASSESSMENT: PREVENTS OR INTERFERES SOME ACTIVE ACTIVITIES AND ADLS

## 2022-10-27 ASSESSMENT — PAIN DESCRIPTION - DESCRIPTORS
DESCRIPTORS: STABBING

## 2022-10-27 ASSESSMENT — PAIN DESCRIPTION - LOCATION
LOCATION: ABDOMEN

## 2022-10-27 ASSESSMENT — PAIN SCALES - GENERAL
PAINLEVEL_OUTOF10: 8
PAINLEVEL_OUTOF10: 3
PAINLEVEL_OUTOF10: 8

## 2022-10-27 ASSESSMENT — PAIN DESCRIPTION - ORIENTATION
ORIENTATION: MID

## 2022-10-27 NOTE — CARE COORDINATION
DISCHARGE SUMMARY     DATE OF DISCHARGE: 10/27/22    DISCHARGE DESTINATION: Home with Girlfriend    HOME CARE: No    HEMODIALYSIS: No    TRANSPORTATION: Private Car    NEW DME ORDERED: no    COMMENTS: Discharge to home. Girlfriend will transport when she gets off work. [facility] Treatment Consultant Geronimo HARLEY-II will meet with patient prior to discharge to discuss Rehab Options.  Electronically signed by Stefano Blackmon RN on 10/27/2022 at 10:31 AM

## 2022-10-27 NOTE — PLAN OF CARE
Problem: Discharge Planning  Goal: Discharge to home or other facility with appropriate resources  10/27/2022 1155 by She Jacobo RN  Outcome: Completed  10/26/2022 2208 by Cooper He RN  Outcome: Progressing     Problem: Pain  Goal: Verbalizes/displays adequate comfort level or baseline comfort level  10/27/2022 1155 by She Jacobo RN  Outcome: Completed  10/26/2022 2208 by Cooper He RN  Outcome: Progressing     Problem: Safety - Adult  Goal: Free from fall injury  10/27/2022 1155 by She Jacobo RN  Outcome: Completed  10/26/2022 2208 by Cooper He RN  Outcome: Progressing     Problem: ABCDS Injury Assessment  Goal: Absence of physical injury  10/27/2022 1155 by She Jacobo RN  Outcome: Completed  10/26/2022 2208 by Cooper He RN  Outcome: Progressing

## 2022-10-27 NOTE — DISCHARGE SUMMARY
Hospital Medicine Discharge Summary    Patient ID: Terry Weber      Patient's PCP: . None (Inactive)    Admit Date: 10/24/2022     Discharge Date:   10/26/22    Admitting Physician: Nithin Eldridge DO     Discharge Physician: LAURA Cabral CNP       Discharge Diagnoses: Active Hospital Problems    Diagnosis Date Noted    AMS (altered mental status) [R41.82] 10/24/2022     Priority: Medium    STEVEN (acute kidney injury) ([address] [N17.9] 10/24/2022     Priority: Medium    Abdominal pain [R10.9] 10/24/2022     Priority: Medium    Hypoxia [R09.02] 10/24/2022     Priority: Medium       The patient was seen and examined on day of discharge and this discharge summary is in conjunction with any daily progress note from day of discharge. Disposition:  [x] Home  [] Home with home health [] Rehab [] Psych [] SNF  [] LTAC  [] Long term nursing home or group home [] Transfer to ICU  [] Transfer to PCU [] Other:    Hospital Course: The patient is a 44 y.o. male with no major past medical history who presents to [facility] with initial concern that 1 week prior he was having persistent intermittent occurrences of diarrhea but no other symptoms of note and then took an over-the-counter antidiarrheal agent and so it resolved but then over the last few days he has unfortunately had recurrent intermittent nausea and vomiting and epigastric abdominal pain. He has had some previous diagnosis of gallstones in the past but uncertain if this is entirely related to his current symptoms because he has not had any other recent GI symptoms up until now over the last week. Apparently patient came to the ED for further evaluation and treatment, but was noted that he went to the bathroom while receiving work-up in the ED and then apparently nursing had apparently found him unresponsive inside the bathroom.   He was immediately rushed to the bed and given Narcan and so this resolved his unresponsive nature. He is currently still on nasal cannula oxygen, potentially from some aspiration or still from being somewhat somnolent but at this time he is very alert and oriented and able to give me further details. He is very apologetic for the incident. Apart from the above symptoms that are noted, he denies that his friends or family are afflicted with any similar symptoms. Over the course of the last week or so with the diarrhea and the nausea and vomiting, he denies any other recent symptoms of fever, chills, dysuria, dizziness, syncope, chest pain, shortness of breath, blood in urine/stool/sputum/vomitus, leg swelling, rashes        10/25: RUQ ultrasound today - fatty liver. States he does eat a lot of fatty and greasy foods because he is a . He states his symtoms started with diarrhea and then progressed to abd pain, n/v about 3 days later. Last episode of emesis Monday afternoon at the hospital, no diarrhea for a couple weeks. Denies drinking alcohol daily. No recent antibiotics. No prior abd surgeries, CT neg for acute findings. STEVEN likely due to low po intake, dehydration. On 3L nc with no acute findings CT chest. Will repeat CXR for possible aspiration pneumonia after he took a pain pill and then passed out. States he has had kidney stones in the past years ago. He said he passed them all on his own. He said back then he was drinking a lot of sweet tea. His urine is neg for hematuria or leuks. CXR neg for acute findings. Interval History/Subjective: NPO for EGD today. Off O2. + hep C. Will need outpatient f/u for this. He said he took some treatment for it. Vomited yesterday after beef broth soup.      -----EGD showed Grade C esophagitis, no ulcers. Acute kidney injury  - cont to monitor BMP  - cont IVF for now with NPO status for n/v/abd pain        Abdominal pain/ n/v/d, possibly due to viral gastroenteritis  - diarrhea has stopped as has nausea and vomiting.  Has some RUQ Conjunctivae/corneas clear. Neck: Supple, with full range of motion. No jugular venous distention. Trachea midline. Respiratory:  Normal respiratory effort. Clear to auscultation, bilaterally without Rales/Wheezes/Rhonchi. Cardiovascular: Regular rate and rhythm with normal S1/S2 without murmurs, rubs or gallops. Abdomen: Soft, non-tender, non-distended with normal bowel sounds. Musculoskelatal: No clubbing, cyanosis or edema bilaterally. Full range of motion without deformity. Skin: Skin color, texture, turgor normal.  No rashes or lesions. Neurologic:  Neurovascularly intact without any focal sensory/motor deficits. Cranial nerves: II-XII intact, grossly non-focal.  Psychiatric: Alert and oriented, thought content appropriate, normal insight      Consults:     IP CONSULT TO HOSPITALIST  IP CONSULT TO GI    Diagnostic tests: Imaging:  XR CHEST (2 VW)   Final Result   No acute cardiopulmonary findings           US GALLBLADDER RUQ   Final Result   Hepatic steatosis. Otherwise, unremarkable right upper quadrant ultrasound. XR CHEST PORTABLE   Final Result   Mild cardiomegaly which is more prominent with no acute pulmonary abnormality. CT ABDOMEN PELVIS W IV CONTRAST Additional Contrast? None   Final Result   1. Decreased size and conspicuity of previously described prominent   periportal lymph nodes. 2.  Mild hepatic steatosis. Consider correlation with LFTs. Labs:  For convenience and continuity at follow-up the following most recent labs are provided:      CBC:    Lab Results   Component Value Date/Time    WBC 10.0 10/27/2022 05:11 AM    HGB 13.7 10/27/2022 05:11 AM    HCT 41.1 10/27/2022 05:11 AM     10/27/2022 05:11 AM       Renal:    Lab Results   Component Value Date/Time     10/27/2022 05:11 AM    K 4.4 10/27/2022 05:11 AM    K 4.1 10/16/2022 07:50 PM     10/27/2022 05:11 AM    CO2 27 10/27/2022 05:11 AM    BUN 12 10/27/2022 05:11 AM CREATININE 1.1 10/27/2022 05:11 AM    CALCIUM 8.8 10/27/2022 05:11 AM           Discharge Instructions/Follow-up:  protonix twice daily. F/u with GI outpatient for further eval and treatment outpatient FibroScan for treatment of chronic hep C. Stop using drugs. PCP/SNF to follow up: 1 wk f/u     D/C condition: stable    Code status: full        Discharge Medications:     Current Discharge Medication List             Details   dicyclomine (BENTYL) 10 MG capsule Take 1 capsule by mouth every 6 hours as needed (cramps)  Qty: 20 capsule, Refills: 0      famotidine (PEPCID) 20 MG tablet Take 1 tablet by mouth 2 times daily  Qty: 60 tablet, Refills: 0      ondansetron (ZOFRAN ODT) 4 MG disintegrating tablet Take 1 tablet by mouth every 8 hours as needed for Nausea  Qty: 20 tablet, Refills: 0             Time Spent on discharge is more than 30 minutes in the examination, evaluation, counseling and review of medications and discharge plan. Signed:    LAURA Cazares CNP   10/27/2022      Thank you . None (Inactive) for the opportunity to be involved in this patient's care. If you have any questions or concerns please feel free to contact me at [phone].

## 2022-10-27 NOTE — PROGRESS NOTES
INPATIENT CONSULTATION:    IDENTIFYING DATA/REASON FOR CONSULTATION   PATIENT:  Kamron Hubbard  MRN:  [id number]  ADMIT DATE: 10/24/2022  TIME OF EVALUATION: 10/27/2022 8:22 AM  HOSPITAL STAY:   LOS: 3 days   CONSULTING PHYSICIAN: Colletta Smiles, MD   REASON FOR CONSULTATION: Nausea, vomiting and diarrhea    Subjective:    Patient seen and examined in follow-up. Patient reports tolerating EGD 10/26/2022, notable for LA grade C esophagitis and mild gastric erythema. There was no source of coffee-ground emesis. Patient reports he had an episode of coffee-ground emesis after his EGD, which does not fit EGD findings. MEDICATIONS   SCHEDULED:  sodium chloride flush, 5-40 mL, 2 times per day  methocarbamol, 750 mg, TID  pantoprazole, 40 mg, BID  nicotine, 1 patch, Daily  sodium chloride flush, 5-40 mL, 2 times per day  enoxaparin, 40 mg, Daily      FLUIDS/DRIPS:     sodium chloride      sodium chloride       PRNs: sodium chloride flush, 5-40 mL, PRN  sodium chloride, , PRN  ondansetron, 4 mg, Once PRN  diphenhydrAMINE, 12.5 mg, Once PRN  oxyCODONE, 5 mg, Q6H PRN  calcium carbonate, 500 mg, TID PRN  sodium chloride flush, 5-40 mL, PRN  sodium chloride, , PRN  acetaminophen, 650 mg, Q6H PRN   Or  acetaminophen, 650 mg, Q6H PRN  ondansetron, 4 mg, Q6H PRN      ALLERGIES:    Allergies   Allergen Reactions    Depakote [Divalproex Sodium] Hives and Swelling    Pcn [Penicillins] Swelling         PHYSICAL EXAM     Vitals:    10/27/22 0517 10/27/22 0521 10/27/22 0639 10/27/22 0733   BP: 108/63   120/67   Pulse: 54   55   Resp: 16  18 18   Temp: 98.3 °F (36.8 °C)   98.2 °F (36.8 °C)   TempSrc: Oral   Oral   SpO2: 96%   97%   Weight:  185 lb 10 oz (84.2 kg)     Height:           I/O last 3 completed shifts: In: 500 [I.V.:500]  Out: 3500 [Urine:3500]    Physical Exam:  Gen: Resting in bed, NAD   HEENT: normocephalic, atraumatic. No scleral icterus.    CV: RRR no MRG   Pul: CTAB, normal work of breathing without wheezing  Abd: Good bowel sounds throughout, soft, NT/ND, no masses, no HSM   Ext: No edema, atraumatic  Neuro: No gross deficits, moves all 4 extremities, follows commands  Skin: No jaundice, spider angiomas, navarro erythema     LABS AND IMAGING     Recent Results (from the past 24 hour(s))   CBC with Auto Differential    Collection Time: 10/27/22  5:11 AM   Result Value Ref Range    WBC 10.0 4.0 - 11.0 K/uL    RBC 4.67 4.20 - 5.90 M/uL    Hemoglobin 13.7 13.5 - 17.5 g/dL    Hematocrit 41.1 40.5 - 52.5 %    MCV 88.1 80.0 - 100.0 fL    MCH 29.4 26.0 - 34.0 pg    MCHC 33.4 31.0 - 36.0 g/dL    RDW 14.8 12.4 - 15.4 %    Platelets 913 782 - 451 K/uL    MPV 10.5 5.0 - 10.5 fL    Neutrophils % 63.9 %    Lymphocytes % 25.6 %    Monocytes % 8.0 %    Eosinophils % 1.5 %    Basophils % 1.0 %    Neutrophils Absolute 6.4 1.7 - 7.7 K/uL    Lymphocytes Absolute 2.6 1.0 - 5.1 K/uL    Monocytes Absolute 0.8 0.0 - 1.3 K/uL    Eosinophils Absolute 0.1 0.0 - 0.6 K/uL    Basophils Absolute 0.1 0.0 - 0.2 K/uL   Comprehensive Metabolic Panel    Collection Time: 10/27/22  5:11 AM   Result Value Ref Range    Sodium 140 136 - 145 mmol/L    Potassium 4.4 3.5 - 5.1 mmol/L    Chloride 103 99 - 110 mmol/L    CO2 27 21 - 32 mmol/L    Anion Gap 10 3 - 16    Glucose 121 (H) 70 - 99 mg/dL    BUN 12 7 - 20 mg/dL    Creatinine 1.1 0.9 - 1.3 mg/dL    Est, Glom Filt Rate >60 >60    Calcium 8.8 8.3 - 10.6 mg/dL    Total Protein 6.8 6.4 - 8.2 g/dL    Albumin 3.9 3.4 - 5.0 g/dL    Albumin/Globulin Ratio 1.3 1.1 - 2.2    Total Bilirubin 0.7 0.0 - 1.0 mg/dL    Alkaline Phosphatase 48 40 - 129 U/L    ALT 51 (H) 10 - 40 U/L    AST 31 15 - 37 U/L   Magnesium    Collection Time: 10/27/22  5:11 AM   Result Value Ref Range    Magnesium 1.90 1.80 - 2.40 mg/dL     Other Labs    Imaging:    XR CHEST (2 VW)   Final Result   No acute cardiopulmonary findings         US GALLBLADDER RUQ   Final Result   Hepatic steatosis.       Otherwise, unremarkable right upper quadrant ultrasound. XR CHEST PORTABLE   Final Result   Mild cardiomegaly which is more prominent with no acute pulmonary abnormality. CT ABDOMEN PELVIS W IV CONTRAST Additional Contrast? None   Final Result   1. Decreased size and conspicuity of previously described prominent   periportal lymph nodes. 2.  Mild hepatic steatosis. Consider correlation with LFTs. ASSESSMENT AND RECOMMENDATIONS   Mary Ellis is a 18-year-old male with past medical history of polysubstance abuse and hepatitis C who presented to [facility] [place] 10/24/2022 with abdominal pain, nausea, vomiting and diarrhea. Nausea, vomiting and diarrhea: Suspect viral gastroenteritis. Continue supportive care and antiemetics, per primary team.  Coffee-ground emesis: Likely due to esophagitis. EGD 10/26/2022 notable for esophagitis and mild gastritis, but no active bleeding. Continue PPI twice daily. Abnormal LFTs: Presumed 2/2 chronic hepatitis C. Full serologic evaluation negative or pending. Right upper quadrant ultrasound notable for hepatic steatosis. Will need outpatient FibroScan for treatment of chronic hepatitis C. Chronic HCV: Genotype and viral load unknown. Recommend outpatient FibroScan on follow-up, with plan for treatment. Polysubstance abuse: Patient with evidence of polysubstance abuse on UDS. Patient counseled on the importance of complete cessation of illicit substance use. RECOMMENDATIONS:    -PPI bid x12 weeks  -Outpatient follow-up for hepatitis C treatment  -Avoid substance abuse  -Okay to discharge from GI standpoint    Thank you for allowing me to participate in this patient's care. No further GI work-up needed at this time. We will sign off, however please contact us with any additional questions at [phone]. Patrick Treviño.  [address] normal...

## 2022-10-27 NOTE — PROGRESS NOTES
Pt being Dcd this date. PIV to Left hand removed , no complications noted. Pt  provided AVS as well as work note per request. Pt awaiting medications from pharmacy to DC. Pt has all personal belongings, denies any questions or concerns .  Pt leaving in lyft per request

## 2022-10-28 LAB — MITOCHONDRIAL M2 AB, IGG: 0.7 U/ML (ref 0–4)

## 2022-11-01 LAB
C282Y HEMOCHROMATOSIS MUT: NORMAL
H63D HEMOCHROMATOSIS MUT: NEGATIVE
HEMOCHROMATOSIS GENE ANALYSIS: NORMAL
HFE PCR SPECIMEN: NORMAL
S65C HEMOCHROMATOSIS MUT: NEGATIVE

## 2023-05-16 NOTE — PROGRESS NOTES
Medications administered and monitored by CRNA, see anesthesia record.     Electronically signed by Gabriella Altamirano RN on 10/26/2022 at 1:55 PM Quality 226: Preventive Care And Screening: Tobacco Use: Screening And Cessation Intervention: Patient screened for tobacco use and is an ex/non-smoker Quality 431: Preventive Care And Screening: Unhealthy Alcohol Use - Screening: Patient not identified as an unhealthy alcohol user when screened for unhealthy alcohol use using a systematic screening method Detail Level: Detailed Quality 130: Documentation Of Current Medications In The Medical Record: Current Medications Documented Quality 111:Pneumonia Vaccination Status For Older Adults: Patient did not receive any pneumococcal conjugate or polysaccharide vaccine on or after their 60th birthday and before the end of the measurement period Quality 110: Preventive Care And Screening: Influenza Immunization: Influenza Immunization Ordered or Recommended, but not Administered due to system reason

## 2023-08-05 ENCOUNTER — HOSPITAL ENCOUNTER (EMERGENCY)
Age: 38
Discharge: HOME | End: 2023-08-05

## 2023-08-05 ENCOUNTER — APPOINTMENT (OUTPATIENT)
Dept: CT IMAGING | Age: 38
End: 2023-08-05

## 2023-08-05 VITALS
SYSTOLIC BLOOD PRESSURE: 123 MMHG | BODY MASS INDEX: 24.02 KG/M2 | WEIGHT: 158.51 LBS | RESPIRATION RATE: 16 BRPM | TEMPERATURE: 98.1 F | HEIGHT: 68 IN | OXYGEN SATURATION: 98 % | HEART RATE: 72 BPM | DIASTOLIC BLOOD PRESSURE: 73 MMHG

## 2023-08-05 DIAGNOSIS — N20.1 URETEROLITHIASIS: Primary | ICD-10-CM

## 2023-08-05 LAB
ALBUMIN SERPL-MCNC: 4 G/DL (ref 3.4–5)
ALBUMIN/GLOB SERPL: 1.3 {RATIO} (ref 1.1–2.2)
ALP SERPL-CCNC: 63 U/L (ref 40–129)
ALT SERPL-CCNC: 109 U/L (ref 10–40)
ANION GAP SERPL CALCULATED.3IONS-SCNC: 12 MMOL/L (ref 3–16)
AST SERPL-CCNC: 76 U/L (ref 15–37)
BACTERIA URNS QL MICRO: NORMAL /HPF
BASOPHILS # BLD: 0 K/UL (ref 0–0.2)
BASOPHILS NFR BLD: 0.3 %
BILIRUB SERPL-MCNC: 0.6 MG/DL (ref 0–1)
BILIRUB UR QL STRIP.AUTO: NEGATIVE
BUN SERPL-MCNC: 12 MG/DL (ref 7–20)
CALCIUM SERPL-MCNC: 9.1 MG/DL (ref 8.3–10.6)
CHLORIDE SERPL-SCNC: 106 MMOL/L (ref 99–110)
CLARITY UR: ABNORMAL
CO2 SERPL-SCNC: 27 MMOL/L (ref 21–32)
COLOR UR: ABNORMAL
CREAT SERPL-MCNC: 0.8 MG/DL (ref 0.9–1.3)
DEPRECATED RDW RBC AUTO: 13.3 % (ref 12.4–15.4)
EOSINOPHIL # BLD: 0.6 K/UL (ref 0–0.6)
EOSINOPHIL NFR BLD: 6.2 %
EPI CELLS #/AREA URNS AUTO: 1 /HPF (ref 0–5)
GFR SERPLBLD CREATININE-BSD FMLA CKD-EPI: >60 ML/MIN/{1.73_M2}
GLUCOSE SERPL-MCNC: 127 MG/DL (ref 70–99)
GLUCOSE UR STRIP.AUTO-MCNC: NEGATIVE MG/DL
HCT VFR BLD AUTO: 38.5 % (ref 40.5–52.5)
HGB BLD-MCNC: 13 G/DL (ref 13.5–17.5)
HGB UR QL STRIP.AUTO: NEGATIVE
HYALINE CASTS #/AREA URNS AUTO: 2 /LPF (ref 0–8)
KETONES UR STRIP.AUTO-MCNC: ABNORMAL MG/DL
LEUKOCYTE ESTERASE UR QL STRIP.AUTO: ABNORMAL
LYMPHOCYTES # BLD: 3.1 K/UL (ref 1–5.1)
LYMPHOCYTES NFR BLD: 33.4 %
MCH RBC QN AUTO: 29.5 PG (ref 26–34)
MCHC RBC AUTO-ENTMCNC: 33.6 G/DL (ref 31–36)
MCV RBC AUTO: 87.6 FL (ref 80–100)
MONOCYTES # BLD: 0.8 K/UL (ref 0–1.3)
MONOCYTES NFR BLD: 8.6 %
NEUTROPHILS # BLD: 4.8 K/UL (ref 1.7–7.7)
NEUTROPHILS NFR BLD: 51.5 %
NITRITE UR QL STRIP.AUTO: NEGATIVE
PH UR STRIP.AUTO: 7 [PH] (ref 5–8)
PLATELET # BLD AUTO: 283 K/UL (ref 135–450)
PMV BLD AUTO: 10 FL (ref 5–10.5)
POTASSIUM SERPL-SCNC: 3.9 MMOL/L (ref 3.5–5.1)
PROT SERPL-MCNC: 7.2 G/DL (ref 6.4–8.2)
PROT UR STRIP.AUTO-MCNC: ABNORMAL MG/DL
RBC # BLD AUTO: 4.4 M/UL (ref 4.2–5.9)
RBC CLUMPS #/AREA URNS AUTO: 1 /HPF (ref 0–4)
SODIUM SERPL-SCNC: 145 MMOL/L (ref 136–145)
SP GR UR STRIP.AUTO: 1.03 (ref 1–1.03)
UA COMPLETE W REFLEX CULTURE PNL UR: ABNORMAL
UA DIPSTICK W REFLEX MICRO PNL UR: YES
URN SPEC COLLECT METH UR: ABNORMAL
UROBILINOGEN UR STRIP-ACNC: 1 E.U./DL
WBC # BLD AUTO: 9.3 K/UL (ref 4–11)
WBC #/AREA URNS AUTO: 2 /HPF (ref 0–5)

## 2023-08-05 PROCEDURE — 2580000003 HC RX 258: Performed by: PHYSICIAN ASSISTANT

## 2023-08-05 PROCEDURE — 99284 EMERGENCY DEPT VISIT MOD MDM: CPT

## 2023-08-05 PROCEDURE — 96374 THER/PROPH/DIAG INJ IV PUSH: CPT

## 2023-08-05 PROCEDURE — 85025 COMPLETE CBC W/AUTO DIFF WBC: CPT

## 2023-08-05 PROCEDURE — 6360000002 HC RX W HCPCS: Performed by: PHYSICIAN ASSISTANT

## 2023-08-05 PROCEDURE — 96376 TX/PRO/DX INJ SAME DRUG ADON: CPT

## 2023-08-05 PROCEDURE — 74176 CT ABD & PELVIS W/O CONTRAST: CPT

## 2023-08-05 PROCEDURE — 81001 URINALYSIS AUTO W/SCOPE: CPT

## 2023-08-05 PROCEDURE — 80053 COMPREHEN METABOLIC PANEL: CPT

## 2023-08-05 RX ORDER — SODIUM CHLORIDE, SODIUM LACTATE, POTASSIUM CHLORIDE, AND CALCIUM CHLORIDE .6; .31; .03; .02 G/100ML; G/100ML; G/100ML; G/100ML
1000 INJECTION, SOLUTION INTRAVENOUS ONCE
Status: COMPLETED | OUTPATIENT
Start: 2023-08-05 | End: 2023-08-05

## 2023-08-05 RX ORDER — OXYCODONE HYDROCHLORIDE AND ACETAMINOPHEN 5; 325 MG/1; MG/1
1 TABLET ORAL EVERY 6 HOURS PRN
Qty: 12 TABLET | Refills: 0 | Status: SHIPPED | OUTPATIENT
Start: 2023-08-05 | End: 2023-08-08

## 2023-08-05 RX ORDER — KETOROLAC TROMETHAMINE 30 MG/ML
30 INJECTION, SOLUTION INTRAMUSCULAR; INTRAVENOUS ONCE
Status: COMPLETED | OUTPATIENT
Start: 2023-08-05 | End: 2023-08-05

## 2023-08-05 RX ORDER — TAMSULOSIN HYDROCHLORIDE 0.4 MG/1
0.4 CAPSULE ORAL DAILY
Qty: 30 CAPSULE | Refills: 0 | Status: SHIPPED | OUTPATIENT
Start: 2023-08-05

## 2023-08-05 RX ORDER — ONDANSETRON 2 MG/ML
4 INJECTION INTRAMUSCULAR; INTRAVENOUS ONCE
Status: COMPLETED | OUTPATIENT
Start: 2023-08-05 | End: 2023-08-05

## 2023-08-05 RX ORDER — ONDANSETRON 4 MG/1
4 TABLET, ORALLY DISINTEGRATING ORAL 3 TIMES DAILY PRN
Qty: 12 TABLET | Refills: 0 | Status: SHIPPED | OUTPATIENT
Start: 2023-08-05

## 2023-08-05 RX ADMIN — KETOROLAC TROMETHAMINE 30 MG: 30 INJECTION, SOLUTION INTRAMUSCULAR; INTRAVENOUS at 06:44

## 2023-08-05 RX ADMIN — HYDROMORPHONE HYDROCHLORIDE 1 MG: 1 INJECTION, SOLUTION INTRAMUSCULAR; INTRAVENOUS; SUBCUTANEOUS at 08:08

## 2023-08-05 RX ADMIN — ONDANSETRON 4 MG: 2 INJECTION INTRAMUSCULAR; INTRAVENOUS at 06:44

## 2023-08-05 RX ADMIN — SODIUM CHLORIDE, POTASSIUM CHLORIDE, SODIUM LACTATE AND CALCIUM CHLORIDE 1000 ML: 600; 310; 30; 20 INJECTION, SOLUTION INTRAVENOUS at 07:03

## 2023-08-05 RX ADMIN — HYDROMORPHONE HYDROCHLORIDE 1 MG: 1 INJECTION, SOLUTION INTRAMUSCULAR; INTRAVENOUS; SUBCUTANEOUS at 07:08

## 2023-08-05 ASSESSMENT — LIFESTYLE VARIABLES
HOW OFTEN DO YOU HAVE A DRINK CONTAINING ALCOHOL: MONTHLY OR LESS
HOW MANY STANDARD DRINKS CONTAINING ALCOHOL DO YOU HAVE ON A TYPICAL DAY: 1 OR 2
HOW MANY STANDARD DRINKS CONTAINING ALCOHOL DO YOU HAVE ON A TYPICAL DAY: PATIENT DOES NOT DRINK
HOW OFTEN DO YOU HAVE A DRINK CONTAINING ALCOHOL: NEVER

## 2023-08-05 ASSESSMENT — PAIN SCALES - GENERAL
PAINLEVEL_OUTOF10: 8
PAINLEVEL_OUTOF10: 10
PAINLEVEL_OUTOF10: 1
PAINLEVEL_OUTOF10: 1
PAINLEVEL_OUTOF10: 8
PAINLEVEL_OUTOF10: 10

## 2023-08-05 ASSESSMENT — PAIN DESCRIPTION - ORIENTATION
ORIENTATION: LOWER
ORIENTATION: RIGHT

## 2023-08-05 ASSESSMENT — PAIN - FUNCTIONAL ASSESSMENT
PAIN_FUNCTIONAL_ASSESSMENT: INTOLERABLE, UNABLE TO DO ANY ACTIVE OR PASSIVE ACTIVITIES
PAIN_FUNCTIONAL_ASSESSMENT: PREVENTS OR INTERFERES SOME ACTIVE ACTIVITIES AND ADLS

## 2023-08-05 ASSESSMENT — PAIN DESCRIPTION - LOCATION
LOCATION: ABDOMEN;BACK;PELVIS
LOCATION: ABDOMEN

## 2023-08-05 ASSESSMENT — PAIN DESCRIPTION - DESCRIPTORS
DESCRIPTORS: SHARP
DESCRIPTORS: ACHING

## 2023-08-05 ASSESSMENT — PAIN DESCRIPTION - PAIN TYPE: TYPE: ACUTE PAIN

## 2023-08-05 ASSESSMENT — PAIN DESCRIPTION - ONSET: ONSET: ON-GOING

## 2023-08-05 ASSESSMENT — PAIN DESCRIPTION - FREQUENCY: FREQUENCY: CONTINUOUS

## 2023-08-05 NOTE — ED NOTES
Urine specimen requested from pt. Pt states he is not able to urinate at this time. Aware to notify RN when able to provide specimen. Urinal at bedside.       Keysha Bustamante RN  08/05/23 9936

## 2023-08-05 NOTE — ED NOTES
Report received from off going RN [facility]. Per report, pt to ED with c/o RLQ abd pain that radiates to pelvis. Pt states he heard a \"pop\" an hour prior to ED arrival, and the severe pain started. Hx kidney stones. States he is having difficulty urinating. Pt resting in bed comfortably at this time, remains on cardiac monitor with hr in the 80's, respirations easy and even. Call light in reach, will continue to monitor closely.        Pascual Bocanegra RN  08/05/23 0710       Sarah Parnell RN  08/05/23 0115

## 2023-08-05 NOTE — ED NOTES
Discharge and education instructions reviewed. Patient verbalized understanding, teach-back successful. Patient denied questions at this time. No acute distress noted. Patient instructed to follow-up as noted - return to emergency department if symptoms worsen. Patient verbalized understanding. Discharged per EDMD with discharge instructions.        Mars Canales RN  08/05/23 8900

## 2023-08-05 NOTE — ED PROVIDER NOTES
[address] [facility] [address] [address]        Pt Name: Alex Farmer  MRN: [id number]  [address] [facility] 1985  Date of evaluation: 8/5/2023  Provider: Tanja Cornejo PA-C  PCP: No primary care provider on file. Note Started: 7:07 AM EDT 8/5/23      [place]. I have evaluated this patient. CHIEF COMPLAINT       Chief Complaint   Patient presents with    Abdominal Pain     Lower R abd pain that radiates into pelvis; states heard a pop an hour ago and severe pain began; hx of kidney stones; states unable to pee       HISTORY OF PRESENT ILLNESS: 1 or more Elements     History From: patient    Alex Farmer is a 43 y.o. male with past medical history of kidney stones who presents complaining of lower abdominal pain and right flank pain x1 hour. Patient states he had acute onset of severe pain in the suprapubic and right lower quadrant area that radiates to the right flank. Pain is typical of prior kidney stones. Denies recent urological procedure, fever, blood in urine. He has had nausea and vomiting. Denies prior abdominal surgery. Nursing Notes were all reviewed and agreed with or any disagreements were addressed in the HPI. REVIEW OF SYSTEMS :      Review of Systems   All other systems reviewed and are negative. Positives and Pertinent negatives as per HPI. PAST MEDICAL HISTORY    has no past medical history on file.      SURGICAL HISTORY     Past Surgical History:   Procedure Laterality Date    ANKLE SURGERY      UPPER GASTROINTESTINAL ENDOSCOPY N/A 10/26/2022    EGD BIOPSY performed by Phi Alejandra MD at [address] [facility]       Previous Medications    PANTOPRAZOLE (PROTONIX) 20 MG TABLET    Take 1 tablet by mouth in the morning and at bedtime       ALLERGIES     Depakote [divalproex sodium] and Pcn [penicillins]    FAMILYHISTORY       Family History   Problem Relation Age of Onset    Cancer Other

## 2023-08-05 NOTE — ED NOTES
Pt resting in bed comfortably with hob elevated. No s/s or c/o pain or distress noted or reported. Respirations easy and even. Call light in reach, will continue to monitor closely.         Nolan Prakash RN  08/05/23 2812

## 2024-09-26 ENCOUNTER — APPOINTMENT (OUTPATIENT)
Dept: CT IMAGING | Age: 39
End: 2024-09-26

## 2024-09-26 ENCOUNTER — APPOINTMENT (OUTPATIENT)
Dept: GENERAL RADIOLOGY | Age: 39
End: 2024-09-26

## 2024-09-26 ENCOUNTER — HOSPITAL ENCOUNTER (INPATIENT)
Age: 39
LOS: 1 days | Discharge: LEFT BEFORE DISCHARGE | End: 2024-09-27
Attending: EMERGENCY MEDICINE | Admitting: FAMILY MEDICINE

## 2024-09-26 DIAGNOSIS — R10.9 LEFT FLANK PAIN: Primary | ICD-10-CM

## 2024-09-26 DIAGNOSIS — R65.10 SIRS (SYSTEMIC INFLAMMATORY RESPONSE SYNDROME) (HCC): ICD-10-CM

## 2024-09-26 DIAGNOSIS — E87.20 LACTIC ACIDOSIS: ICD-10-CM

## 2024-09-26 LAB
ALBUMIN SERPL-MCNC: 4.7 G/DL (ref 3.4–5)
ALBUMIN/GLOB SERPL: 1.3 {RATIO} (ref 1.1–2.2)
ALP SERPL-CCNC: 86 U/L (ref 40–129)
ALT SERPL-CCNC: 119 U/L (ref 10–40)
AMPHETAMINES UR QL SCN>1000 NG/ML: ABNORMAL
ANION GAP SERPL CALCULATED.3IONS-SCNC: 18 MMOL/L (ref 3–16)
APAP SERPL-MCNC: <5 UG/ML (ref 10–30)
AST SERPL-CCNC: 57 U/L (ref 15–37)
BACTERIA URNS QL MICRO: ABNORMAL /HPF
BARBITURATES UR QL SCN>200 NG/ML: ABNORMAL
BASE EXCESS BLDV CALC-SCNC: -6.1 MMOL/L
BASOPHILS # BLD: 0.1 K/UL (ref 0–0.2)
BASOPHILS NFR BLD: 0.6 %
BENZODIAZ UR QL SCN>200 NG/ML: ABNORMAL
BILIRUB SERPL-MCNC: 0.9 MG/DL (ref 0–1)
BILIRUB UR QL STRIP.AUTO: NEGATIVE
BUN SERPL-MCNC: 13 MG/DL (ref 7–20)
CALCIUM SERPL-MCNC: 10 MG/DL (ref 8.3–10.6)
CANNABINOIDS UR QL SCN>50 NG/ML: ABNORMAL
CHLORIDE SERPL-SCNC: 101 MMOL/L (ref 99–110)
CLARITY UR: CLEAR
CO2 BLDV-SCNC: 20 MMOL/L
CO2 SERPL-SCNC: 24 MMOL/L (ref 21–32)
COCAINE UR QL SCN: ABNORMAL
COHGB MFR BLDV: 0.6 %
COLOR UR: YELLOW
CREAT SERPL-MCNC: 1.1 MG/DL (ref 0.9–1.3)
DEPRECATED RDW RBC AUTO: 13.6 % (ref 12.4–15.4)
DRUG SCREEN COMMENT UR-IMP: ABNORMAL
EKG ATRIAL RATE: 122 BPM
EKG DIAGNOSIS: NORMAL
EKG P AXIS: 22 DEGREES
EKG P-R INTERVAL: 130 MS
EKG Q-T INTERVAL: 384 MS
EKG QRS DURATION: 80 MS
EKG QTC CALCULATION (BAZETT): 547 MS
EKG R AXIS: 55 DEGREES
EKG T AXIS: -34 DEGREES
EKG VENTRICULAR RATE: 122 BPM
EOSINOPHIL # BLD: 0 K/UL (ref 0–0.6)
EOSINOPHIL NFR BLD: 0.1 %
EPI CELLS #/AREA URNS HPF: ABNORMAL /HPF (ref 0–5)
ETHANOLAMINE SERPL-MCNC: NORMAL MG/DL (ref 0–0.08)
FENTANYL SCREEN, URINE: POSITIVE
FLUAV RNA UPPER RESP QL NAA+PROBE: NEGATIVE
FLUBV AG NPH QL: NEGATIVE
GFR SERPLBLD CREATININE-BSD FMLA CKD-EPI: 87 ML/MIN/{1.73_M2}
GLUCOSE SERPL-MCNC: 174 MG/DL (ref 70–99)
GLUCOSE UR STRIP.AUTO-MCNC: NEGATIVE MG/DL
HCO3 BLDV-SCNC: 19 MMOL/L (ref 23–29)
HCT VFR BLD AUTO: 41.6 % (ref 40.5–52.5)
HGB BLD-MCNC: 14.3 G/DL (ref 13.5–17.5)
HGB UR QL STRIP.AUTO: ABNORMAL
HYALINE CASTS #/AREA URNS LPF: ABNORMAL /LPF (ref 0–2)
KETONES UR STRIP.AUTO-MCNC: 15 MG/DL
LACTATE BLDV-SCNC: 10.4 MMOL/L (ref 0.4–1.9)
LACTATE BLDV-SCNC: 3 MMOL/L (ref 0.4–2)
LACTATE BLDV-SCNC: 5.1 MMOL/L (ref 0.4–1.9)
LACTATE BLDV-SCNC: 6.5 MMOL/L (ref 0.4–2)
LACTATE BLDV-SCNC: 7.3 MMOL/L (ref 0.4–2)
LEUKOCYTE ESTERASE UR QL STRIP.AUTO: NEGATIVE
LYMPHOCYTES # BLD: 2.5 K/UL (ref 1–5.1)
LYMPHOCYTES NFR BLD: 15.8 %
MCH RBC QN AUTO: 29.4 PG (ref 26–34)
MCHC RBC AUTO-ENTMCNC: 34.4 G/DL (ref 31–36)
MCV RBC AUTO: 85.6 FL (ref 80–100)
METHADONE UR QL SCN>300 NG/ML: ABNORMAL
METHGB MFR BLDV: 0.3 %
MONOCYTES # BLD: 0.3 K/UL (ref 0–1.3)
MONOCYTES NFR BLD: 2.2 %
MUCOUS THREADS #/AREA URNS LPF: ABNORMAL /LPF
NEUTROPHILS # BLD: 12.7 K/UL (ref 1.7–7.7)
NEUTROPHILS NFR BLD: 81.3 %
NITRITE UR QL STRIP.AUTO: NEGATIVE
O2 THERAPY: ABNORMAL
OPIATES UR QL SCN>300 NG/ML: POSITIVE
OXYCODONE UR QL SCN: ABNORMAL
PCO2 BLDV: 36.7 MMHG (ref 40–50)
PCP UR QL SCN>25 NG/ML: ABNORMAL
PH BLDV: 7.33 [PH] (ref 7.35–7.45)
PH UR STRIP.AUTO: 6 [PH] (ref 5–8)
PH UR STRIP: 6 [PH]
PLATELET # BLD AUTO: 358 K/UL (ref 135–450)
PMV BLD AUTO: 9.9 FL (ref 5–10.5)
PO2 BLDV: 50 MMHG
POTASSIUM SERPL-SCNC: 3.8 MMOL/L (ref 3.5–5.1)
PROCALCITONIN SERPL IA-MCNC: 0.06 NG/ML (ref 0–0.15)
PROT SERPL-MCNC: 8.2 G/DL (ref 6.4–8.2)
PROT UR STRIP.AUTO-MCNC: ABNORMAL MG/DL
RBC # BLD AUTO: 4.87 M/UL (ref 4.2–5.9)
RBC #/AREA URNS HPF: ABNORMAL /HPF (ref 0–4)
SALICYLATES SERPL-MCNC: <0.5 MG/DL (ref 15–30)
SAO2 % BLDV: 85 %
SARS-COV-2 RDRP RESP QL NAA+PROBE: NOT DETECTED
SODIUM SERPL-SCNC: 143 MMOL/L (ref 136–145)
SP GR UR STRIP.AUTO: >=1.03 (ref 1–1.03)
UA DIPSTICK W REFLEX MICRO PNL UR: YES
URN SPEC COLLECT METH UR: ABNORMAL
UROBILINOGEN UR STRIP-ACNC: 0.2 E.U./DL
WBC # BLD AUTO: 15.6 K/UL (ref 4–11)
WBC #/AREA URNS HPF: ABNORMAL /HPF (ref 0–5)

## 2024-09-26 PROCEDURE — 87040 BLOOD CULTURE FOR BACTERIA: CPT

## 2024-09-26 PROCEDURE — 80053 COMPREHEN METABOLIC PANEL: CPT

## 2024-09-26 PROCEDURE — 6360000002 HC RX W HCPCS: Performed by: FAMILY MEDICINE

## 2024-09-26 PROCEDURE — 6370000000 HC RX 637 (ALT 250 FOR IP): Performed by: STUDENT IN AN ORGANIZED HEALTH CARE EDUCATION/TRAINING PROGRAM

## 2024-09-26 PROCEDURE — 83605 ASSAY OF LACTIC ACID: CPT

## 2024-09-26 PROCEDURE — 80143 DRUG ASSAY ACETAMINOPHEN: CPT

## 2024-09-26 PROCEDURE — 2500000003 HC RX 250 WO HCPCS: Performed by: NURSE PRACTITIONER

## 2024-09-26 PROCEDURE — 6360000004 HC RX CONTRAST MEDICATION: Performed by: EMERGENCY MEDICINE

## 2024-09-26 PROCEDURE — 2580000003 HC RX 258: Performed by: STUDENT IN AN ORGANIZED HEALTH CARE EDUCATION/TRAINING PROGRAM

## 2024-09-26 PROCEDURE — 96365 THER/PROPH/DIAG IV INF INIT: CPT

## 2024-09-26 PROCEDURE — 93010 ELECTROCARDIOGRAM REPORT: CPT | Performed by: INTERNAL MEDICINE

## 2024-09-26 PROCEDURE — 86701 HIV-1ANTIBODY: CPT

## 2024-09-26 PROCEDURE — 80179 DRUG ASSAY SALICYLATE: CPT

## 2024-09-26 PROCEDURE — 86702 HIV-2 ANTIBODY: CPT

## 2024-09-26 PROCEDURE — 2580000003 HC RX 258: Performed by: NURSE PRACTITIONER

## 2024-09-26 PROCEDURE — 81001 URINALYSIS AUTO W/SCOPE: CPT

## 2024-09-26 PROCEDURE — 99223 1ST HOSP IP/OBS HIGH 75: CPT | Performed by: INTERNAL MEDICINE

## 2024-09-26 PROCEDURE — 84145 PROCALCITONIN (PCT): CPT

## 2024-09-26 PROCEDURE — 36415 COLL VENOUS BLD VENIPUNCTURE: CPT

## 2024-09-26 PROCEDURE — 85025 COMPLETE CBC W/AUTO DIFF WBC: CPT

## 2024-09-26 PROCEDURE — 80307 DRUG TEST PRSMV CHEM ANLYZR: CPT

## 2024-09-26 PROCEDURE — 2000000000 HC ICU R&B

## 2024-09-26 PROCEDURE — 87390 HIV-1 AG IA: CPT

## 2024-09-26 PROCEDURE — 74176 CT ABD & PELVIS W/O CONTRAST: CPT

## 2024-09-26 PROCEDURE — 96376 TX/PRO/DX INJ SAME DRUG ADON: CPT

## 2024-09-26 PROCEDURE — 87635 SARS-COV-2 COVID-19 AMP PRB: CPT

## 2024-09-26 PROCEDURE — 6370000000 HC RX 637 (ALT 250 FOR IP): Performed by: FAMILY MEDICINE

## 2024-09-26 PROCEDURE — 74174 CTA ABD&PLVS W/CONTRAST: CPT

## 2024-09-26 PROCEDURE — 71045 X-RAY EXAM CHEST 1 VIEW: CPT

## 2024-09-26 PROCEDURE — 2580000003 HC RX 258: Performed by: FAMILY MEDICINE

## 2024-09-26 PROCEDURE — 99285 EMERGENCY DEPT VISIT HI MDM: CPT

## 2024-09-26 PROCEDURE — 80074 ACUTE HEPATITIS PANEL: CPT

## 2024-09-26 PROCEDURE — 82803 BLOOD GASES ANY COMBINATION: CPT

## 2024-09-26 PROCEDURE — 2580000003 HC RX 258: Performed by: EMERGENCY MEDICINE

## 2024-09-26 PROCEDURE — 87804 INFLUENZA ASSAY W/OPTIC: CPT

## 2024-09-26 PROCEDURE — 94760 N-INVAS EAR/PLS OXIMETRY 1: CPT

## 2024-09-26 PROCEDURE — 93005 ELECTROCARDIOGRAM TRACING: CPT | Performed by: EMERGENCY MEDICINE

## 2024-09-26 PROCEDURE — 96375 TX/PRO/DX INJ NEW DRUG ADDON: CPT

## 2024-09-26 PROCEDURE — 6360000002 HC RX W HCPCS: Performed by: NURSE PRACTITIONER

## 2024-09-26 PROCEDURE — 6360000002 HC RX W HCPCS: Performed by: EMERGENCY MEDICINE

## 2024-09-26 PROCEDURE — 82077 ASSAY SPEC XCP UR&BREATH IA: CPT

## 2024-09-26 RX ORDER — SODIUM CHLORIDE, SODIUM LACTATE, POTASSIUM CHLORIDE, CALCIUM CHLORIDE 600; 310; 30; 20 MG/100ML; MG/100ML; MG/100ML; MG/100ML
INJECTION, SOLUTION INTRAVENOUS CONTINUOUS
Status: DISCONTINUED | OUTPATIENT
Start: 2024-09-26 | End: 2024-09-27 | Stop reason: HOSPADM

## 2024-09-26 RX ORDER — VANCOMYCIN 1.75 G/350ML
1250 INJECTION, SOLUTION INTRAVENOUS ONCE
Status: COMPLETED | OUTPATIENT
Start: 2024-09-26 | End: 2024-09-26

## 2024-09-26 RX ORDER — SODIUM CHLORIDE 0.9 % (FLUSH) 0.9 %
5-40 SYRINGE (ML) INJECTION PRN
Status: DISCONTINUED | OUTPATIENT
Start: 2024-09-26 | End: 2024-09-27 | Stop reason: HOSPADM

## 2024-09-26 RX ORDER — HYDROXYZINE PAMOATE 25 MG/1
50 CAPSULE ORAL EVERY 8 HOURS PRN
Status: DISCONTINUED | OUTPATIENT
Start: 2024-09-26 | End: 2024-09-27 | Stop reason: HOSPADM

## 2024-09-26 RX ORDER — ONDANSETRON 2 MG/ML
4 INJECTION INTRAMUSCULAR; INTRAVENOUS ONCE
Status: COMPLETED | OUTPATIENT
Start: 2024-09-26 | End: 2024-09-26

## 2024-09-26 RX ORDER — METHOCARBAMOL 750 MG/1
750 TABLET, FILM COATED ORAL EVERY 6 HOURS PRN
Status: DISCONTINUED | OUTPATIENT
Start: 2024-09-26 | End: 2024-09-27 | Stop reason: HOSPADM

## 2024-09-26 RX ORDER — DROPERIDOL 2.5 MG/ML
0.62 INJECTION, SOLUTION INTRAMUSCULAR; INTRAVENOUS ONCE
Status: COMPLETED | OUTPATIENT
Start: 2024-09-26 | End: 2024-09-26

## 2024-09-26 RX ORDER — 0.9 % SODIUM CHLORIDE 0.9 %
1000 INTRAVENOUS SOLUTION INTRAVENOUS ONCE
Status: COMPLETED | OUTPATIENT
Start: 2024-09-26 | End: 2024-09-26

## 2024-09-26 RX ORDER — POTASSIUM CHLORIDE 1500 MG/1
40 TABLET, EXTENDED RELEASE ORAL PRN
Status: DISCONTINUED | OUTPATIENT
Start: 2024-09-26 | End: 2024-09-27 | Stop reason: HOSPADM

## 2024-09-26 RX ORDER — SODIUM CHLORIDE 9 MG/ML
INJECTION, SOLUTION INTRAVENOUS ONCE
Status: COMPLETED | OUTPATIENT
Start: 2024-09-26 | End: 2024-09-26

## 2024-09-26 RX ORDER — LIDOCAINE 4 G/G
1 PATCH TOPICAL DAILY
Status: DISCONTINUED | OUTPATIENT
Start: 2024-09-26 | End: 2024-09-27 | Stop reason: HOSPADM

## 2024-09-26 RX ORDER — POLYETHYLENE GLYCOL 3350 17 G/17G
17 POWDER, FOR SOLUTION ORAL DAILY PRN
Status: DISCONTINUED | OUTPATIENT
Start: 2024-09-26 | End: 2024-09-27 | Stop reason: HOSPADM

## 2024-09-26 RX ORDER — LORAZEPAM 2 MG/ML
1 INJECTION INTRAMUSCULAR ONCE
Status: COMPLETED | OUTPATIENT
Start: 2024-09-26 | End: 2024-09-26

## 2024-09-26 RX ORDER — ACETAMINOPHEN 650 MG/1
650 SUPPOSITORY RECTAL EVERY 6 HOURS PRN
Status: DISCONTINUED | OUTPATIENT
Start: 2024-09-26 | End: 2024-09-27 | Stop reason: HOSPADM

## 2024-09-26 RX ORDER — MAGNESIUM SULFATE IN WATER 40 MG/ML
2000 INJECTION, SOLUTION INTRAVENOUS PRN
Status: DISCONTINUED | OUTPATIENT
Start: 2024-09-26 | End: 2024-09-27 | Stop reason: HOSPADM

## 2024-09-26 RX ORDER — POTASSIUM CHLORIDE 7.45 MG/ML
10 INJECTION INTRAVENOUS PRN
Status: DISCONTINUED | OUTPATIENT
Start: 2024-09-26 | End: 2024-09-27 | Stop reason: HOSPADM

## 2024-09-26 RX ORDER — SODIUM CHLORIDE 9 MG/ML
INJECTION, SOLUTION INTRAVENOUS PRN
Status: DISCONTINUED | OUTPATIENT
Start: 2024-09-26 | End: 2024-09-27 | Stop reason: HOSPADM

## 2024-09-26 RX ORDER — ONDANSETRON 4 MG/1
4 TABLET, ORALLY DISINTEGRATING ORAL EVERY 8 HOURS PRN
Status: DISCONTINUED | OUTPATIENT
Start: 2024-09-26 | End: 2024-09-27 | Stop reason: HOSPADM

## 2024-09-26 RX ORDER — ONDANSETRON 2 MG/ML
4 INJECTION INTRAMUSCULAR; INTRAVENOUS EVERY 6 HOURS PRN
Status: DISCONTINUED | OUTPATIENT
Start: 2024-09-26 | End: 2024-09-27 | Stop reason: HOSPADM

## 2024-09-26 RX ORDER — CLONIDINE HYDROCHLORIDE 0.1 MG/1
0.1 TABLET ORAL EVERY 6 HOURS PRN
Status: DISCONTINUED | OUTPATIENT
Start: 2024-09-26 | End: 2024-09-27 | Stop reason: HOSPADM

## 2024-09-26 RX ORDER — ENOXAPARIN SODIUM 100 MG/ML
40 INJECTION SUBCUTANEOUS
Status: DISCONTINUED | OUTPATIENT
Start: 2024-09-26 | End: 2024-09-27 | Stop reason: HOSPADM

## 2024-09-26 RX ORDER — PROMETHAZINE HYDROCHLORIDE 25 MG/1
12.5 TABLET ORAL EVERY 6 HOURS PRN
Status: DISCONTINUED | OUTPATIENT
Start: 2024-09-26 | End: 2024-09-27 | Stop reason: HOSPADM

## 2024-09-26 RX ORDER — SODIUM CHLORIDE 0.9 % (FLUSH) 0.9 %
5-40 SYRINGE (ML) INJECTION EVERY 12 HOURS SCHEDULED
Status: DISCONTINUED | OUTPATIENT
Start: 2024-09-26 | End: 2024-09-27 | Stop reason: HOSPADM

## 2024-09-26 RX ORDER — LANOLIN ALCOHOL/MO/W.PET/CERES
3 CREAM (GRAM) TOPICAL NIGHTLY
Status: DISCONTINUED | OUTPATIENT
Start: 2024-09-26 | End: 2024-09-27 | Stop reason: HOSPADM

## 2024-09-26 RX ORDER — DICYCLOMINE HYDROCHLORIDE 10 MG/1
20 CAPSULE ORAL EVERY 6 HOURS PRN
Status: DISCONTINUED | OUTPATIENT
Start: 2024-09-26 | End: 2024-09-27 | Stop reason: HOSPADM

## 2024-09-26 RX ORDER — MORPHINE SULFATE 4 MG/ML
4 INJECTION, SOLUTION INTRAMUSCULAR; INTRAVENOUS ONCE
Status: COMPLETED | OUTPATIENT
Start: 2024-09-26 | End: 2024-09-26

## 2024-09-26 RX ORDER — LOPERAMIDE HCL 2 MG
2 CAPSULE ORAL 4 TIMES DAILY PRN
Status: DISCONTINUED | OUTPATIENT
Start: 2024-09-26 | End: 2024-09-27 | Stop reason: HOSPADM

## 2024-09-26 RX ORDER — SODIUM CHLORIDE 9 MG/ML
INJECTION, SOLUTION INTRAVENOUS CONTINUOUS
Status: DISCONTINUED | OUTPATIENT
Start: 2024-09-26 | End: 2024-09-26

## 2024-09-26 RX ORDER — ACETAMINOPHEN 325 MG/1
650 TABLET ORAL EVERY 6 HOURS PRN
Status: DISCONTINUED | OUTPATIENT
Start: 2024-09-26 | End: 2024-09-27 | Stop reason: HOSPADM

## 2024-09-26 RX ORDER — PANTOPRAZOLE SODIUM 20 MG/1
20 TABLET, DELAYED RELEASE ORAL
Status: DISCONTINUED | OUTPATIENT
Start: 2024-09-26 | End: 2024-09-27 | Stop reason: HOSPADM

## 2024-09-26 RX ORDER — GABAPENTIN 300 MG/1
300 CAPSULE ORAL EVERY 8 HOURS PRN
Status: DISCONTINUED | OUTPATIENT
Start: 2024-09-26 | End: 2024-09-27 | Stop reason: HOSPADM

## 2024-09-26 RX ORDER — KETOROLAC TROMETHAMINE 15 MG/ML
15 INJECTION, SOLUTION INTRAMUSCULAR; INTRAVENOUS ONCE
Status: COMPLETED | OUTPATIENT
Start: 2024-09-26 | End: 2024-09-26

## 2024-09-26 RX ORDER — 0.9 % SODIUM CHLORIDE 0.9 %
1100 INTRAVENOUS SOLUTION INTRAVENOUS ONCE
Status: COMPLETED | OUTPATIENT
Start: 2024-09-26 | End: 2024-09-26

## 2024-09-26 RX ADMIN — ONDANSETRON 4 MG: 4 TABLET, ORALLY DISINTEGRATING ORAL at 21:10

## 2024-09-26 RX ADMIN — Medication 12.5 MG: at 16:16

## 2024-09-26 RX ADMIN — PANTOPRAZOLE SODIUM 20 MG: 20 TABLET, DELAYED RELEASE ORAL at 15:08

## 2024-09-26 RX ADMIN — ONDANSETRON 4 MG: 2 INJECTION INTRAMUSCULAR; INTRAVENOUS at 09:06

## 2024-09-26 RX ADMIN — HYDROXYZINE PAMOATE 50 MG: 25 CAPSULE ORAL at 23:14

## 2024-09-26 RX ADMIN — METHOCARBAMOL TABLETS 750 MG: 750 TABLET, COATED ORAL at 15:08

## 2024-09-26 RX ADMIN — MORPHINE SULFATE 4 MG: 4 INJECTION, SOLUTION INTRAMUSCULAR; INTRAVENOUS at 10:46

## 2024-09-26 RX ADMIN — MORPHINE SULFATE 4 MG: 4 INJECTION, SOLUTION INTRAMUSCULAR; INTRAVENOUS at 09:50

## 2024-09-26 RX ADMIN — CLONIDINE HYDROCHLORIDE 0.1 MG: 0.1 TABLET ORAL at 21:10

## 2024-09-26 RX ADMIN — PROMETHAZINE HYDROCHLORIDE 12.5 MG: 25 TABLET ORAL at 23:14

## 2024-09-26 RX ADMIN — SODIUM CHLORIDE 1100 ML: 9 INJECTION, SOLUTION INTRAVENOUS at 09:51

## 2024-09-26 RX ADMIN — LORAZEPAM 1 MG: 2 INJECTION INTRAMUSCULAR; INTRAVENOUS at 11:49

## 2024-09-26 RX ADMIN — SODIUM CHLORIDE, POTASSIUM CHLORIDE, SODIUM LACTATE AND CALCIUM CHLORIDE: 600; 310; 30; 20 INJECTION, SOLUTION INTRAVENOUS at 23:26

## 2024-09-26 RX ADMIN — CLONIDINE HYDROCHLORIDE 0.1 MG: 0.1 TABLET ORAL at 15:08

## 2024-09-26 RX ADMIN — DROPERIDOL 0.62 MG: 2.5 INJECTION, SOLUTION INTRAMUSCULAR; INTRAVENOUS at 10:19

## 2024-09-26 RX ADMIN — METHOCARBAMOL TABLETS 750 MG: 750 TABLET, COATED ORAL at 21:09

## 2024-09-26 RX ADMIN — CEFEPIME 2000 MG: 2 INJECTION, POWDER, FOR SOLUTION INTRAVENOUS at 11:07

## 2024-09-26 RX ADMIN — DICYCLOMINE HYDROCHLORIDE 20 MG: 10 CAPSULE ORAL at 21:09

## 2024-09-26 RX ADMIN — THIAMINE HYDROCHLORIDE: 100 INJECTION, SOLUTION INTRAMUSCULAR; INTRAVENOUS at 16:17

## 2024-09-26 RX ADMIN — SODIUM CHLORIDE, POTASSIUM CHLORIDE, SODIUM LACTATE AND CALCIUM CHLORIDE: 600; 310; 30; 20 INJECTION, SOLUTION INTRAVENOUS at 15:20

## 2024-09-26 RX ADMIN — CEFEPIME 2000 MG: 2 INJECTION, POWDER, FOR SOLUTION INTRAVENOUS at 20:16

## 2024-09-26 RX ADMIN — VANCOMYCIN 1250 MG: 1.75 INJECTION, SOLUTION INTRAVENOUS at 12:48

## 2024-09-26 RX ADMIN — SODIUM CHLORIDE 1000 ML: 9 INJECTION, SOLUTION INTRAVENOUS at 09:06

## 2024-09-26 RX ADMIN — HYDROXYZINE PAMOATE 50 MG: 25 CAPSULE ORAL at 15:08

## 2024-09-26 RX ADMIN — GABAPENTIN 300 MG: 300 CAPSULE ORAL at 15:08

## 2024-09-26 RX ADMIN — KETOROLAC TROMETHAMINE 15 MG: 15 INJECTION, SOLUTION INTRAMUSCULAR; INTRAVENOUS at 09:06

## 2024-09-26 RX ADMIN — IOMEPROL INJECTION 100 ML: 714 INJECTION, SOLUTION INTRAVASCULAR at 12:35

## 2024-09-26 RX ADMIN — GABAPENTIN 300 MG: 300 CAPSULE ORAL at 23:14

## 2024-09-26 RX ADMIN — ONDANSETRON 4 MG: 2 INJECTION INTRAMUSCULAR; INTRAVENOUS at 09:50

## 2024-09-26 RX ADMIN — Medication 3 MG: at 21:09

## 2024-09-26 RX ADMIN — CEFTRIAXONE 2000 MG: 1 INJECTION, POWDER, FOR SOLUTION INTRAMUSCULAR; INTRAVENOUS at 10:05

## 2024-09-26 RX ADMIN — SODIUM CHLORIDE: 9 INJECTION, SOLUTION INTRAVENOUS at 12:37

## 2024-09-26 RX ADMIN — ENOXAPARIN SODIUM 40 MG: 100 INJECTION SUBCUTANEOUS at 21:10

## 2024-09-26 ASSESSMENT — PAIN SCALES - GENERAL
PAINLEVEL_OUTOF10: 10
PAINLEVEL_OUTOF10: 8
PAINLEVEL_OUTOF10: 10
PAINLEVEL_OUTOF10: 7
PAINLEVEL_OUTOF10: 7
PAINLEVEL_OUTOF10: 5

## 2024-09-26 ASSESSMENT — PAIN DESCRIPTION - FREQUENCY
FREQUENCY: CONTINUOUS

## 2024-09-26 ASSESSMENT — PAIN - FUNCTIONAL ASSESSMENT
PAIN_FUNCTIONAL_ASSESSMENT: ACTIVITIES ARE NOT PREVENTED
PAIN_FUNCTIONAL_ASSESSMENT: ACTIVITIES ARE NOT PREVENTED
PAIN_FUNCTIONAL_ASSESSMENT: 0-10
PAIN_FUNCTIONAL_ASSESSMENT: ACTIVITIES ARE NOT PREVENTED

## 2024-09-26 ASSESSMENT — PAIN DESCRIPTION - LOCATION
LOCATION: BACK
LOCATION: FLANK
LOCATION: FLANK
LOCATION: BACK

## 2024-09-26 ASSESSMENT — PAIN DESCRIPTION - ORIENTATION
ORIENTATION: MID
ORIENTATION: LEFT
ORIENTATION: LEFT
ORIENTATION: MID

## 2024-09-26 ASSESSMENT — PAIN DESCRIPTION - DESCRIPTORS
DESCRIPTORS: STABBING
DESCRIPTORS: SHARP;SHOOTING
DESCRIPTORS: STABBING
DESCRIPTORS: SHARP

## 2024-09-26 ASSESSMENT — PAIN DESCRIPTION - ONSET
ONSET: ON-GOING

## 2024-09-26 ASSESSMENT — PAIN DESCRIPTION - PAIN TYPE
TYPE: ACUTE PAIN

## 2024-09-26 NOTE — PROGRESS NOTES
Patient arrived to ICU from [facility] ED for flank pain. Upon initial assessment, patient is very diaphoretic, sweat dripping from his face and his gown is wet, pupils are dilated, shaking and patient endorses nausea and left flank pain. COWS protocol initiated after discussion with Dr. Bagley. Patient reports to Dr. Beltran that he is not having flank pain any longer, that it is in his mid back. Patient reports taking percocet that he receives from a friend but denies any other drug use. Patient resting quietly in bed.    Electronically signed by Johnna Gan RN on 9/26/2024 at 3:26 PM

## 2024-09-26 NOTE — PROGRESS NOTES
Clinical Pharmacy Note  Dose Adjustment    Otis Pandey Jr is receiving cefepime for Sepsis. Based on the patient's Estimated Creatinine Clearance: Estimated Creatinine Clearance: 83 mL/min (based on SCr of 1.1 mg/dL). urine output and indication, the dose has been adjusted to 2000mg q8h per protocol.    Pharmacy will continue to monitor and adjust dose as needed for changes in renal function.    Jeffrey Bosch [facility], 9/26/2024 2:14 PM

## 2024-09-26 NOTE — PROGRESS NOTES
4 Eyes Skin Assessment     NAME:  Otis Pandey Jr  YOB: 1985  MEDICAL RECORD NUMBER:  [id number]    The patient is being assessed for  Admission    I agree that at least one RN has performed a thorough Head to Toe Skin Assessment on the patient. ALL assessment sites listed below have been assessed.      Areas assessed by both nurses:    Head, Face, Ears, Shoulders, Back, Chest, Arms, Elbows, Hands, Sacrum. Buttock, Coccyx, Ischium, Legs. Feet and Heels, and Under Medical Devices         Does the Patient have a Wound? No noted wound(s)       Yariel Prevention initiated by RN: No  Wound Care Orders initiated by RN: No    Pressure Injury (Stage 3,4, Unstageable, DTI, NWPT, and Complex wounds) if present, place Wound referral order by RN under : No    New Ostomies, if present place, Ostomy referral order under : No     Nurse 1 eSignature: Electronically signed by Johnna Gan RN on 9/26/24 at 3:24 PM EDT    **SHARE this note so that the co-signing nurse can place an eSignature**    Nurse 2 eSignature: {Esignature:[id number]}

## 2024-09-26 NOTE — PLAN OF CARE
Problem: Pain  Goal: Verbalizes/displays adequate comfort level or baseline comfort level  Outcome: Progressing  Flowsheets (Taken 9/26/2024 1603)  Verbalizes/displays adequate comfort level or baseline comfort level:   Encourage patient to monitor pain and request assistance   Administer analgesics based on type and severity of pain and evaluate response   Consider cultural and social influences on pain and pain management   Assess pain using appropriate pain scale   Implement non-pharmacological measures as appropriate and evaluate response     Problem: Discharge Planning  Goal: Discharge to home or other facility with appropriate resources  Outcome: Progressing  Flowsheets (Taken 9/26/2024 1603)  Discharge to home or other facility with appropriate resources:   Identify barriers to discharge with patient and caregiver   Identify discharge learning needs (meds, wound care, etc)   Refer to discharge planning if patient needs post-hospital services based on physician order or complex needs related to functional status, cognitive ability or social support system   Arrange for needed discharge resources and transportation as appropriate     Problem: Safety - Adult  Goal: Free from fall injury  Outcome: Progressing  Flowsheets (Taken 9/26/2024 1603)  Free From Fall Injury: Instruct family/caregiver on patient safety     Problem: Skin/Tissue Integrity  Goal: Absence of new skin breakdown  Description: 1.  Monitor for areas of redness and/or skin breakdown  2.  Assess vascular access sites hourly  3.  Every 4-6 hours minimum:  Change oxygen saturation probe site  4.  Every 4-6 hours:  If on nasal continuous positive airway pressure, respiratory therapy assess nares and determine need for appliance change or resting period.  Outcome: Progressing     Problem: Gastrointestinal - Adult  Goal: Minimal or absence of nausea and vomiting  Outcome: Progressing  Flowsheets (Taken 9/26/2024 1603)  Minimal or absence of nausea and

## 2024-09-26 NOTE — H&P
V2.0  History and Physical      Name:  Otis Pandey Jr /Age/Sex: 1985  (39 y.o. male)   MRN & CSN:  [id number] & [id number] Encounter Date/Time: 2024 2:34 PM EDT   Location:  [id number] PCP: No primary care provider on file.       Hospital Day: 1    Assessment and Plan:   Otis Pandey Jr is a 39 y.o. male with no known major past medical history who presents with SIRS (systemic inflammatory response syndrome) (HCC)    Severe sepsis, present on admission  -Meets criteria for her greater than 90, respiratory rate greater than 20, WBC greater than 12,000, lactic acidosis  -Unclear source at this time, UA appears noninfectious  -CT abdomen pelvis showed mildly prominent gastrohepatic and gurdeep hepatis to lymph nodes, nonspecific finding  -Chest x-ray without infiltrates  -Blood cultures in process  -Empiric IV vancomycin and IV cefepime    Lactic acidosis  -Suspect secondary to underlying infection  -CTA abdomen/pelvis showed no vascular abnormality  -IV fluid resuscitation  -Check VBG  -Continue to trend lactic    Transaminitis  -Mild, ALT greater than AST, appears to be chronic-elevated previously even back in  in which he underwent an right upper quadrant ultrasound showing concern for fatty liver  -Check hepatitis panel, Tylenol level, salicylate level    Left flank pain  -Unclear etiology, history of nephrolithiasis, but no stone or obstruction seen on imaging    Polysubstance abuse  -UDS positive for opiates and fentanyl, I am highly suspicious for component of withdrawal contributing  -COWS    Disposition:   Current Living situation: Home  Expected Disposition: Home  Estimated D/C: 2 to 3 days    Diet ADULT DIET; Regular   DVT Prophylaxis [x] Lovenox, []  Heparin, [] SCDs, [] Ambulation,  [] Eliquis, [] Xarelto, [] Coumadin   Code Status Full Code         Medical Decision Making:  The following items were considered in medical decision making:  Discussion of patient care with  use: No    Sexual activity: Not Currently       Medications:   Medications:    pantoprazole  20 mg Oral BID AC    sodium chloride flush  5-40 mL IntraVENous 2 times per day    enoxaparin  40 mg SubCUTAneous QHS    cefepime  2,000 mg IntraVENous Q8H      Infusions:    sodium chloride       PRN Meds: sodium chloride flush, 5-40 mL, PRN  sodium chloride, , PRN  potassium chloride, 40 mEq, PRN   Or  potassium alternative oral replacement, 40 mEq, PRN   Or  potassium chloride, 10 mEq, PRN  magnesium sulfate, 2,000 mg, PRN  ondansetron, 4 mg, Q8H PRN   Or  ondansetron, 4 mg, Q6H PRN  polyethylene glycol, 17 g, Daily PRN  acetaminophen, 650 mg, Q6H PRN   Or  acetaminophen, 650 mg, Q6H PRN        Labs      CBC:   Recent Labs     09/26/24  0900   WBC 15.6*   HGB 14.3        BMP:    Recent Labs     09/26/24  0900      K 3.8      CO2 24   BUN 13   CREATININE 1.1   GLUCOSE 174*     Hepatic:   Recent Labs     09/26/24  0900   AST 57*   *   BILITOT 0.9   ALKPHOS 86     Lipids: No results found for: \"CHOL\", \"HDL\", \"TRIG\"  Hemoglobin A1C: No results found for: \"LABA1C\"  TSH: No results found for: \"TSH\"  Troponin: No results found for: \"TROPONINT\"  Lactic Acid:   Recent Labs     09/26/24  1310   LACTA 7.3*     BNP: No results for input(s): \"PROBNP\" in the last 72 hours.  UA:  Lab Results   Component Value Date/Time    NITRU Negative 09/26/2024 11:20 AM    COLORU Yellow 09/26/2024 11:20 AM    PHUR 6.0 09/26/2024 11:27 AM    PHUR 7.0 08/05/2023 07:43 AM    WBCUA 0-2 09/26/2024 11:20 AM    RBCUA 0-2 09/26/2024 11:20 AM    MUCUS Rare 09/26/2024 11:20 AM    BACTERIA Rare 09/26/2024 11:20 AM    CLARITYU Clear 09/26/2024 11:20 AM    LEUKOCYTESUR Negative 09/26/2024 11:20 AM    UROBILINOGEN 0.2 09/26/2024 11:20 AM    BILIRUBINUR Negative 09/26/2024 11:20 AM    BLOODU TRACE-INTACT 09/26/2024 11:20 AM    GLUCOSEU Negative 09/26/2024 11:20 AM    GLUCOSEU NEGATIVE 05/11/2011 11:45 PM    KETUA 15 09/26/2024 11:20 AM

## 2024-09-26 NOTE — PROGRESS NOTES
NAME:  Otis Pandey Jr  YOB: 1985  MEDICAL RECORD NUMBER:  [id number]    Shift Summary: Patient admitted for [facility] for left sided flank pain, N/V; CTAP negative for anything acute. Patient arrived to floor extremely diaphoretic, shaking, with dilated pupils and N/V. Elevated lactic trending down. Noted to have extensive history of drug abuse upon chart review. Urine drug screen positive for opiates and fentanyl but patient denies drug use. States he occasionally gets percocet from \"a friend\". COWS protocol initiated for suspected opiate withdrawal.     Family updated: No    Rhythm: Sinus Tachycardia     Most recent vitals:   Visit Vitals  BP (!) 143/82   Pulse (!) 114   Temp 98.1 °F (36.7 °C) (Oral)   Resp 20   Ht 1.702 m (5' 7\")   Wt 69.6 kg (153 lb 7 oz)   SpO2 100%   BMI 24.03 kg/m²        Respiratory support needed (if any):  - RA    Admission weight Weight - Scale: 69.6 kg (153 lb 7 oz) (09/26/24 0852)    Today's weight    Wt Readings from Last 1 Encounters:   09/26/24 69.6 kg (153 lb 7 oz)        Aguila need assessed each shift: N/A - no aguila present  UOP >30ml/hr: YES  Last documented BM (in last 48 hrs):  Patient Vitals for the past 48 hrs:   Stool Occurrence   09/26/24 1851 0                Restraints (in use currently or dc'd in last 12 hrs): No    Order current and documentation up to date? N/A    Lines/Drains reviewed @ bedside.  Peripheral IV 09/26/24 Right Forearm (Active)   Number of days: 0       Peripheral IV 09/26/24 Left Antecubital (Active)   Number of days: 0         Drip rates at handoff:    sodium chloride      lactated ringers IV soln 125 mL/hr at 09/26/24 1854       Lab Data:   CBC:   Recent Labs     09/26/24  0900   WBC 15.6*   HGB 14.3   HCT 41.6   MCV 85.6        BMP:    Recent Labs     09/26/24  0900      K 3.8   CO2 24   BUN 13   CREATININE 1.1     LIVR:   Recent Labs     09/26/24  0900   AST 57*   *     PT/INR: No results for input(s):

## 2024-09-26 NOTE — CONSULTS
REASON FOR CONSULTATION/CC: SIRS      Consult at request of Libby Bagley MD     PCP: No primary care provider on file.  Established Pulmonologist:  None    Chief Complaint   Patient presents with    Flank Pain     C/o left flank pain with nausea and vomiting since 0500. Hx of kidney stones. States it feels the same.        HISTORY OF PRESENT ILLNESS: Otis Pandey Jr is a 39 y.o. year old male with a history of substance abuse who presents with flank pain.  Patient transferred to the ICU for tachycardia and lactic acidosis up to 10.  This is now started to decline but patient remains tachycardic.  During my visit patient confirms flank pain denies fever but does have active rigoring      History reviewed. No pertinent past medical history.      Past Surgical History:   Procedure Laterality Date    ANKLE SURGERY      UPPER GASTROINTESTINAL ENDOSCOPY N/A 10/26/2022    EGD BIOPSY performed by Anup Haas MD at [facility] ENDOSCOPY       Family Hx  family history includes Cancer in an other family member; Diabetes in an other family member; High Blood Pressure in an other family member; Stroke in an other family member.    Social Hx   reports that he has quit smoking. He has never used smokeless tobacco.    Scheduled Meds:   pantoprazole  20 mg Oral BID AC    sodium chloride flush  5-40 mL IntraVENous 2 times per day    enoxaparin  40 mg SubCUTAneous QHS    cefepime  2,000 mg IntraVENous Q8H    melatonin  3 mg Oral Nightly    lidocaine  1 patch TransDERmal Daily       Continuous Infusions:   sodium chloride      lactated ringers IV soln         PRN Meds:  sodium chloride flush, sodium chloride, potassium chloride **OR** potassium alternative oral replacement **OR** potassium chloride, magnesium sulfate, ondansetron **OR** ondansetron, polyethylene glycol, acetaminophen **OR** acetaminophen, promethazine, promethazine, methocarbamol, cloNIDine, loperamide, dicyclomine, gabapentin, hydrOXYzine  reports were reviewed as a part of this visit.      Access  CVC   Arterial          PICC             CVC                  Ma         Assessment/Plan:       SIRS  -No gross infectious source thus far  -Blood culture x 2, lactate trending down  -Status post 3 L IV fluid resuscitation  -Empiric antibiotics  -Add on Pro-Benjamin    Polysubstance abuse  -History of opiate and cocaine abuse though patient denies his UDS has been positive multiple times  -No cocaine on today's UDS  -Monitor for signs and symptoms of opiate withdrawal  -Banana bag    Flank pain  -CT unremarkable, reported history of stones       This note was transcribed using Dragon Dictation software. Please disregard any translational errors.    Thank you for the consult    Rishi Castro Pulmonary, Sleep and Critical Care Medicine

## 2024-09-26 NOTE — ED NOTES
Transferred to [facility] by [facility] with belongings at side.   NS and vanco continue infusing per pump.

## 2024-09-26 NOTE — CONSULTS
Clinical Pharmacy Note  Vancomycin Consult    Pharmacy consult received for one-time dose of vancomycin in the Emergency Department per Dr. Moe.    Ht Readings from Last 1 Encounters:   09/26/24 1.702 m (5' 7\")        Wt Readings from Last 1 Encounters:   09/26/24 69.6 kg (153 lb 7 oz)         Assessment/Plan:  Vancomycin 1250 mg IV x 1 in ED.  If vancomycin is to continue on admission and pharmacy is to manage dosing, please re-consult with admission orders.  Elle Mendoza [facility],9/26/2024,11:11 AM

## 2024-09-26 NOTE — CONSULTS
Clinical Pharmacy Note  Vancomycin Consult    Otis Pandey Jr is a 39 y.o. male ordered vancomycin for sepsis; consult received from Dr. Bagley to manage therapy. Also receiving cefepime.    Allergies:  Depakote [divalproex sodium] and Pcn [penicillins]     Temp max:  Temp (24hrs), Av.8 °F (37.1 °C), Min:98.1 °F (36.7 °C), Max:99.2 °F (37.3 °C)      Recent Labs     24  0900   WBC 15.6*       Recent Labs     24  0900   BUN 13   CREATININE 1.1         Intake/Output Summary (Last 24 hours) at 2024 1707  Last data filed at 2024 1632  Gross per 24 hour   Intake 450 ml   Output 400 ml   Net 50 ml       Culture Results:  pending    Ht Readings from Last 1 Encounters:   24 1.702 m (5' 7\")        Wt Readings from Last 1 Encounters:   24 69.6 kg (153 lb 7 oz)         Estimated Creatinine Clearance: 83 mL/min (based on SCr of 1.1 mg/dL).    Assessment/Plan:  Day # 1 of vancomycin.  Received vancomycin 1250 mg x 1 this afternoon.  Start vancomycin 1000 mg IV every 12 hours.    Goal -600  Predicted AUC at steady state = 509  Check vancomycin random level on 24.    Thank you for the consult.      Cathy Nunez PharmD  2024 5:09 PM

## 2024-09-26 NOTE — ED PROVIDER NOTES
[facility] EMERGENCY DEPT    CHIEF COMPLAINT  Flank Pain (C/o left flank pain with nausea and vomiting since 0500. Hx of kidney stones. States it feels the same. )       HISTORY OF PRESENT ILLNESS  Otis Pandey Jr is a 39 y.o. male who presents to the ED complaining of left flank pain, nausea, and vomiting.  Symptoms started this morning at 5 AM. Previous history of ureteral stone.  This feels similar.  Has not taken anything for the pain.  Complains of diaphoresis.  Denies chest pain or shortness of breath.  Complains of dysuria.  Has not been able to void this morning.  Denies EtOH or illicits.    I have reviewed the following from the nursing documentation:    History reviewed. No pertinent past medical history.  Past Surgical History:   Procedure Laterality Date    ANKLE SURGERY      UPPER GASTROINTESTINAL ENDOSCOPY N/A 10/26/2022    EGD BIOPSY performed by Anup Haas MD at [facility] ENDOSCOPY     Family History   Problem Relation Age of Onset    Cancer Other     Diabetes Other     High Blood Pressure Other     Stroke Other      Social History     Socioeconomic History    Marital status: Single     Spouse name: Not on file    Number of children: 1    Years of education: Not on file    Highest education level: Not on file   Occupational History    Occupation: Unemployed   Tobacco Use    Smoking status: Former    Smokeless tobacco: Never    Tobacco comments:     10/16/22 vapes   Vaping Use    Vaping status: Every Day    Substances: Nicotine, Flavoring    Devices: Pre-filled or refillable cartridge   Substance and Sexual Activity    Alcohol use: Yes     Comment: 10/16/22 beer once every month    Drug use: No    Sexual activity: Not Currently   Other Topics Concern    Not on file   Social History Narrative    Not on file     Social Determinants of Health     Financial Resource Strain: Not on file   Food Insecurity: Not on file   Transportation Needs: Not on file   Physical Activity: Not on file   Stress: Not on file    Social Connections: Not on file   Intimate Partner Violence: Not on file   Housing Stability: Not on file     Current Facility-Administered Medications   Medication Dose Route Frequency Provider Last Rate Last Admin    vancomycin (VANCOCIN) 1250 mg in 250 mL IVPB  1,250 mg IntraVENous Once Amaury Moe .7 mL/hr at 09/26/24 1248 1,250 mg at 09/26/24 1248    pantoprazole (PROTONIX) tablet 20 mg  20 mg Oral BID Libby Bagley MD        sodium chloride flush 0.9 % injection 5-40 mL  5-40 mL IntraVENous 2 times per day Libby Bagley MD        sodium chloride flush 0.9 % injection 5-40 mL  5-40 mL IntraVENous PRN Libby Bagley MD        0.9 % sodium chloride infusion   IntraVENous PRN Libby Bagley MD        potassium chloride (KLOR-CON M) extended release tablet 40 mEq  40 mEq Oral PRN Libby Bagley MD        Or    potassium bicarb-citric acid (EFFER-K) effervescent tablet 40 mEq  40 mEq Oral PRN Libby Bagley MD        Or    potassium chloride 10 mEq/100 mL IVPB (Peripheral Line)  10 mEq IntraVENous PRN Libby Bagley MD        magnesium sulfate 2000 mg in 50 mL IVPB premix  2,000 mg IntraVENous PRN Libby Bagley MD        enoxaparin (LOVENOX) injection 40 mg  40 mg SubCUTAneous Daily Libby Bagley MD        ondansetron (ZOFRAN-ODT) disintegrating tablet 4 mg  4 mg Oral Q8H PRN Libby Bagley MD        Or    ondansetron (ZOFRAN) injection 4 mg  4 mg IntraVENous Q6H PRN Libby Bagley MD        polyethylene glycol (GLYCOLAX) packet 17 g  17 g Oral Daily PRN Libby Bagley MD        acetaminophen (TYLENOL) tablet 650 mg  650 mg Oral Q6H PRN Libby Bagley MD        Or    acetaminophen (TYLENOL) suppository 650 mg  650 mg Rectal Q6H PRN Libby Bagley MD        ceFEPIme (MAXIPIME) 2,000 mg in sodium chloride 0.9 % 100 mL IVPB (mini-bag)  2,000 mg IntraVENous Q12H Libby Bagley MD         Allergies   Allergen Reactions    Depakote [Divalproex Sodium] Hives and Swelling    Pcn [Penicillins] Swelling         PHYSICAL

## 2024-09-27 VITALS
HEART RATE: 105 BPM | TEMPERATURE: 99.2 F | DIASTOLIC BLOOD PRESSURE: 90 MMHG | RESPIRATION RATE: 21 BRPM | BODY MASS INDEX: 24.08 KG/M2 | WEIGHT: 153.44 LBS | SYSTOLIC BLOOD PRESSURE: 139 MMHG | OXYGEN SATURATION: 99 % | HEIGHT: 67 IN

## 2024-09-27 LAB
BACTERIA BLD CULT ORG #2: NORMAL
BACTERIA BLD CULT: NORMAL
HAV IGM SERPL QL IA: ABNORMAL
HBV CORE IGM SERPL QL IA: ABNORMAL
HBV SURFACE AG SERPL QL IA: ABNORMAL
HCV AB SERPL QL IA: REACTIVE
HIV 1+2 AB+HIV1 P24 AG SERPL QL IA: NORMAL
HIV 2 AB SERPL QL IA: NORMAL
HIV1 AB SERPL QL IA: NORMAL
HIV1 P24 AG SERPL QL IA: NORMAL

## 2024-09-27 NOTE — PROGRESS NOTES
Pt is stating he wants to leave. This RN educated patient at length on why he should stay in the hospital and continue treatment as he is going through drug withdrawal. Pt is still stating he wants to leave despite education. Pt is AAOx4. Celine Conte NP notified and asked to come speak to patient.

## 2024-09-27 NOTE — PROGRESS NOTES
Call initiated by: Nursing staff:  Yeny  Call addressed around: 9/27/2024 12:57 AM      Reason for call: I was notified by the patient's bedside nurse that the patient was requesting to leave AMA.  I went to the patient's bedside and spoke with the patient.  I discussed his current treatment plan and the risks of leaving AMA which includes worsening of his symptoms, worsening infection/sepsis and possible death.  He verbalized understanding and states that he needs to go home to be with his family.  Patient stated that this is not his first time of being sick in this manner but he always manages at home.  I discussed his lab findings and diagnosis of severe sepsis with him.  He verbalized understanding but still insisted on leaving AMA.  The patient is completely oriented.  I encouraged him to come to return to the nearest hospital if he has worsening symptoms.  He signed the A paperwork.      Orders placed: None        LAURA Monique - CNP

## 2024-09-27 NOTE — PROGRESS NOTES
Despite being educated by this RN and Celine Conte NP, patient is still electing to leave AMA. Patient signed AMA form. PIV's removed from patient.

## 2024-09-29 NOTE — DISCHARGE SUMMARY
Peritoneum/Retroperitoneum: Persistent mildly prominent gastrohepatic and gurdeep hepatic lymph nodes, nonspecific.  No free fluid or pneumoperitoneum. The abdominal aorta is normal in caliber. Bones/Soft Tissues: No acute osseous or soft tissue abnormality.     1.  No evidence of obstructive uropathy or findings to explain patient's left flank pain. 2.  No acute vascular abnormality. 3.  Redemonstrated mildly gastrohepatic and gurdeep hepatic lymph nodes, nonspecific.     XR CHEST PORTABLE    Result Date: 9/26/2024  EXAMINATION: ONE XRAY VIEW OF THE CHEST 9/26/2024 10:09 am COMPARISON: 10/25/2022 HISTORY: ORDERING SYSTEM PROVIDED HISTORY: sepsis eval TECHNOLOGIST PROVIDED HISTORY: Reason for exam:->sepsis eval Reason for Exam: sepsis eval FINDINGS: The lungs are without acute focal process.  There is no effusion or pneumothorax. The cardiomediastinal silhouette is stable. The osseous structures are stable.     No acute process.     CT ABDOMEN PELVIS WO CONTRAST Additional Contrast? None    Result Date: 9/26/2024  EXAMINATION: CT OF THE ABDOMEN AND PELVIS WITHOUT CONTRAST 9/26/2024 8:57 am TECHNIQUE: CT of the abdomen and pelvis was performed without the administration of intravenous contrast. Multiplanar reformatted images are provided for review. Automated exposure control, iterative reconstruction, and/or weight based adjustment of the mA/kV was utilized to reduce the radiation dose to as low as reasonably achievable. COMPARISON: 08/05/2023 CT abdomen/pelvis, 10/24/2022 CT abdomen/pelvis HISTORY: ORDERING SYSTEM PROVIDED HISTORY: LEFT flank pain h/o ureteral stones TECHNOLOGIST PROVIDED HISTORY: Reason for exam:->LEFT flank pain h/o ureteral stones Additional Contrast?->None Decision Support Exception - unselect if not a suspected or confirmed emergency medical condition->Emergency Medical Condition (MA) Reason for Exam: LEFT flank pain h/o ureteral stones FINDINGS: Lower Chest: The lung bases are clear.  The heart

## 2024-09-30 LAB
BACTERIA BLD CULT ORG #2: NORMAL
BACTERIA BLD CULT: NORMAL

## 2025-04-11 ENCOUNTER — HOSPITAL ENCOUNTER (EMERGENCY)
Age: 40
Discharge: HOME | End: 2025-04-11
Attending: EMERGENCY MEDICINE

## 2025-04-11 ENCOUNTER — APPOINTMENT (OUTPATIENT)
Dept: CT IMAGING | Age: 40
End: 2025-04-11

## 2025-04-11 VITALS
WEIGHT: 149.69 LBS | SYSTOLIC BLOOD PRESSURE: 135 MMHG | HEIGHT: 67 IN | TEMPERATURE: 98.5 F | HEART RATE: 98 BPM | RESPIRATION RATE: 14 BRPM | OXYGEN SATURATION: 97 % | DIASTOLIC BLOOD PRESSURE: 89 MMHG | BODY MASS INDEX: 23.49 KG/M2

## 2025-04-11 DIAGNOSIS — R10.9 LEFT FLANK PAIN: ICD-10-CM

## 2025-04-11 DIAGNOSIS — K02.9 DENTAL DECAY: ICD-10-CM

## 2025-04-11 DIAGNOSIS — K59.00 CONSTIPATION, UNSPECIFIED CONSTIPATION TYPE: ICD-10-CM

## 2025-04-11 DIAGNOSIS — H53.9 VISION CHANGES: ICD-10-CM

## 2025-04-11 DIAGNOSIS — K08.89 ODONTALGIA: Primary | ICD-10-CM

## 2025-04-11 LAB
ALBUMIN SERPL-MCNC: 4.7 G/DL (ref 3.4–5)
ALBUMIN/GLOB SERPL: 1.5 {RATIO} (ref 1.1–2.2)
ALP SERPL-CCNC: 65 U/L (ref 40–129)
ALT SERPL-CCNC: 37 U/L (ref 10–40)
ANION GAP SERPL CALCULATED.3IONS-SCNC: 11 MMOL/L (ref 3–16)
AST SERPL-CCNC: 25 U/L (ref 15–37)
BASOPHILS # BLD: 0.1 K/UL (ref 0–0.2)
BASOPHILS NFR BLD: 0.7 %
BILIRUB SERPL-MCNC: 1.1 MG/DL (ref 0–1)
BILIRUB UR QL STRIP.AUTO: NEGATIVE
BUN SERPL-MCNC: 17 MG/DL (ref 7–20)
CALCIUM SERPL-MCNC: 9.2 MG/DL (ref 8.3–10.6)
CHLORIDE SERPL-SCNC: 101 MMOL/L (ref 99–110)
CLARITY UR: CLEAR
CO2 SERPL-SCNC: 27 MMOL/L (ref 21–32)
COLOR UR: YELLOW
CREAT SERPL-MCNC: 1 MG/DL (ref 0.9–1.3)
DEPRECATED RDW RBC AUTO: 13.8 % (ref 12.4–15.4)
EOSINOPHIL # BLD: 0.2 K/UL (ref 0–0.6)
EOSINOPHIL NFR BLD: 1.6 %
GFR SERPLBLD CREATININE-BSD FMLA CKD-EPI: >90 ML/MIN/{1.73_M2}
GLUCOSE SERPL-MCNC: 119 MG/DL (ref 70–99)
GLUCOSE UR STRIP.AUTO-MCNC: NEGATIVE MG/DL
HCT VFR BLD AUTO: 39 % (ref 40.5–52.5)
HGB BLD-MCNC: 13.1 G/DL (ref 13.5–17.5)
HGB UR QL STRIP.AUTO: NEGATIVE
KETONES UR STRIP.AUTO-MCNC: NEGATIVE MG/DL
LEUKOCYTE ESTERASE UR QL STRIP.AUTO: NEGATIVE
LIPASE SERPL-CCNC: 11 U/L (ref 13–60)
LYMPHOCYTES # BLD: 3 K/UL (ref 1–5.1)
LYMPHOCYTES NFR BLD: 26.1 %
MCH RBC QN AUTO: 29.6 PG (ref 26–34)
MCHC RBC AUTO-ENTMCNC: 33.6 G/DL (ref 31–36)
MCV RBC AUTO: 88 FL (ref 80–100)
MONOCYTES # BLD: 0.7 K/UL (ref 0–1.3)
MONOCYTES NFR BLD: 6.1 %
NEUTROPHILS # BLD: 7.6 K/UL (ref 1.7–7.7)
NEUTROPHILS NFR BLD: 65.5 %
NITRITE UR QL STRIP.AUTO: NEGATIVE
PH UR STRIP.AUTO: 6 [PH] (ref 5–8)
PLATELET # BLD AUTO: 280 K/UL (ref 135–450)
PMV BLD AUTO: 9.6 FL (ref 5–10.5)
POTASSIUM SERPL-SCNC: 4.2 MMOL/L (ref 3.5–5.1)
PROT SERPL-MCNC: 7.9 G/DL (ref 6.4–8.2)
PROT UR STRIP.AUTO-MCNC: NEGATIVE MG/DL
RBC # BLD AUTO: 4.43 M/UL (ref 4.2–5.9)
SODIUM SERPL-SCNC: 139 MMOL/L (ref 136–145)
SP GR UR STRIP.AUTO: >=1.03 (ref 1–1.03)
UA COMPLETE W REFLEX CULTURE PNL UR: NORMAL
UA DIPSTICK W REFLEX MICRO PNL UR: NORMAL
URN SPEC COLLECT METH UR: NORMAL
UROBILINOGEN UR STRIP-ACNC: 0.2 E.U./DL
WBC # BLD AUTO: 11.6 K/UL (ref 4–11)

## 2025-04-11 PROCEDURE — 74176 CT ABD & PELVIS W/O CONTRAST: CPT

## 2025-04-11 PROCEDURE — 80053 COMPREHEN METABOLIC PANEL: CPT

## 2025-04-11 PROCEDURE — 87591 N.GONORRHOEAE DNA AMP PROB: CPT

## 2025-04-11 PROCEDURE — 6360000002 HC RX W HCPCS: Performed by: EMERGENCY MEDICINE

## 2025-04-11 PROCEDURE — 85025 COMPLETE CBC W/AUTO DIFF WBC: CPT

## 2025-04-11 PROCEDURE — 87491 CHLMYD TRACH DNA AMP PROBE: CPT

## 2025-04-11 PROCEDURE — 36415 COLL VENOUS BLD VENIPUNCTURE: CPT

## 2025-04-11 PROCEDURE — 96374 THER/PROPH/DIAG INJ IV PUSH: CPT

## 2025-04-11 PROCEDURE — 99284 EMERGENCY DEPT VISIT MOD MDM: CPT

## 2025-04-11 PROCEDURE — 81003 URINALYSIS AUTO W/O SCOPE: CPT

## 2025-04-11 PROCEDURE — 83690 ASSAY OF LIPASE: CPT

## 2025-04-11 RX ORDER — CLINDAMYCIN HYDROCHLORIDE 150 MG/1
150 CAPSULE ORAL 4 TIMES DAILY
Qty: 40 CAPSULE | Refills: 0 | Status: SHIPPED | OUTPATIENT
Start: 2025-04-11 | End: 2025-04-21

## 2025-04-11 RX ORDER — POLYETHYLENE GLYCOL 3350 17 G/17G
17 POWDER, FOR SOLUTION ORAL DAILY
Qty: 510 G | Refills: 0 | Status: SHIPPED | OUTPATIENT
Start: 2025-04-11 | End: 2025-05-11

## 2025-04-11 RX ORDER — KETOROLAC TROMETHAMINE 15 MG/ML
15 INJECTION, SOLUTION INTRAMUSCULAR; INTRAVENOUS ONCE
Status: COMPLETED | OUTPATIENT
Start: 2025-04-11 | End: 2025-04-11

## 2025-04-11 RX ORDER — DOCUSATE SODIUM 100 MG/1
100 CAPSULE, LIQUID FILLED ORAL 2 TIMES DAILY
Qty: 60 CAPSULE | Refills: 0 | Status: SHIPPED | OUTPATIENT
Start: 2025-04-11

## 2025-04-11 RX ADMIN — KETOROLAC TROMETHAMINE 15 MG: 15 INJECTION, SOLUTION INTRAMUSCULAR; INTRAVENOUS at 21:41

## 2025-04-11 ASSESSMENT — PAIN - FUNCTIONAL ASSESSMENT
PAIN_FUNCTIONAL_ASSESSMENT: ACTIVITIES ARE NOT PREVENTED
PAIN_FUNCTIONAL_ASSESSMENT: 0-10
PAIN_FUNCTIONAL_ASSESSMENT: ACTIVITIES ARE NOT PREVENTED

## 2025-04-11 ASSESSMENT — PAIN DESCRIPTION - DESCRIPTORS
DESCRIPTORS: DISCOMFORT

## 2025-04-11 ASSESSMENT — PAIN DESCRIPTION - ORIENTATION
ORIENTATION: RIGHT
ORIENTATION: RIGHT
ORIENTATION: LEFT
ORIENTATION: RIGHT

## 2025-04-11 ASSESSMENT — VISUAL ACUITY
OS: 20/50
OU: 20/30
OD: 20/30

## 2025-04-11 ASSESSMENT — PAIN SCALES - GENERAL
PAINLEVEL_OUTOF10: 7
PAINLEVEL_OUTOF10: 7
PAINLEVEL_OUTOF10: 4
PAINLEVEL_OUTOF10: 4

## 2025-04-11 ASSESSMENT — PAIN DESCRIPTION - PAIN TYPE
TYPE: ACUTE PAIN

## 2025-04-11 ASSESSMENT — PAIN DESCRIPTION - ONSET
ONSET: ON-GOING

## 2025-04-11 ASSESSMENT — PAIN DESCRIPTION - FREQUENCY
FREQUENCY: CONTINUOUS

## 2025-04-11 ASSESSMENT — PAIN DESCRIPTION - LOCATION
LOCATION: ABDOMEN;FLANK

## 2025-04-12 NOTE — DISCHARGE INSTRUCTIONS
Follow-up with [facility] as soon as possible regarding vision changes.  Call today for an appointment.    Follow-up with a dentist to soon as possible.  Call today for an appointment.Drink plenty of fluids.  Follow-up with a primary care physician in 1 to 2 days for reexamination.  Call today for an appointment.  If condition worsens or new symptoms develop, return immediately to the emergency department.

## 2025-04-12 NOTE — ED PROVIDER NOTES
[facility] EMERGENCY DEPARTMENT  EMERGENCY DEPARTMENT ENCOUNTER      Pt Name: Otis Pandey Jr  MRN: [id number]  Birthdate 1985  Date of evaluation: 4/11/2025  Provider: BERNARD BAUTISTA DO    CHIEF COMPLAINT       Chief Complaint   Patient presents with    Flank Pain     Pt presents to the ED d/t left lower flank pain, LLQ abdominal pain for the last 3 days. +dysuria, +nauseated, and diarrhea.   Pt stated he's been working with chemicals that have been affecting him. Blurry Vision; vision acuity 20/30 bilateral          HISTORY OF PRESENT ILLNESS   (Location/Symptom, Timing/Onset, Context/Setting, Quality, Duration, Modifying Factors, Severity)  Note limiting factors.   Otis Pandey Jr is a 40 y.o. male who presents to the emergency department with a complaint of left lower quadrant abdominal pain radiating to the left posterior flank area when he urinates.  He has been nauseated with this but denies any vomiting.  He has had a couple of loose stools but no actual diarrhea.  No melena or hematochezia he denies any hematuria frequency or urgency.  He does report that when he urinates sometimes it feels like he has to go but not much comes out.  No fever or chills.  No cough or cold symptoms.  No chest pain or shortness of breath.    He has a secondary complaint that he would like some antibiotics for an infected tooth.  He reports that his right maxillary third molar has broken off and is decayed.  He has an appointment to see the dentist on Thursday.  He denies any drainage or discharge.  No fever or chills.  No sore throat.  No chest pain or shortness of breath.  No neck pain.    He also reports that he would like his vision checked.  He states that it has been blurry off and on for a long time.  He has noticed it more over the last few days, and it is bilateral, affecting both his near and far vision.  He does not currently wear glasses or contact lenses.  He denies any vision

## 2025-04-14 LAB
C TRACH DNA UR QL NAA+PROBE: NEGATIVE
N GONORRHOEA DNA UR QL NAA+PROBE: NEGATIVE

## 2025-04-15 ENCOUNTER — RESULTS FOLLOW-UP (OUTPATIENT)
Dept: EMERGENCY DEPT | Age: 40
End: 2025-04-15

## (undated) DEVICE — ENDOSCOPY KIT: Brand: MEDLINE INDUSTRIES, INC.

## (undated) DEVICE — FORMALIN CLEAR VIAL 20 ML 10%

## (undated) DEVICE — FORCEPS BX 240CM 2.4MM L NDL RAD JAW 4 M00513334

## (undated) DEVICE — BITE BLOCK ENDOSCP AD 60 FR W/ ADJ STRP PLAS GRN BLOX